# Patient Record
Sex: FEMALE | Race: WHITE | Employment: FULL TIME | URBAN - METROPOLITAN AREA
[De-identification: names, ages, dates, MRNs, and addresses within clinical notes are randomized per-mention and may not be internally consistent; named-entity substitution may affect disease eponyms.]

---

## 2016-07-11 LAB — ANTIBODY: NORMAL

## 2019-12-20 LAB
AVERAGE GLUCOSE: 108
CHOLESTEROL, TOTAL: 223 MG/DL
CHOLESTEROL/HDL RATIO: ABNORMAL
HBA1C MFR BLD: 5.4 %
HDLC SERPL-MCNC: 78 MG/DL (ref 35–70)
LDL CHOLESTEROL CALCULATED: 118 MG/DL (ref 0–160)
NONHDLC SERPL-MCNC: ABNORMAL MG/DL
TRIGL SERPL-MCNC: 137 MG/DL
VLDLC SERPL CALC-MCNC: 27 MG/DL

## 2020-06-07 ENCOUNTER — HOSPITAL ENCOUNTER (EMERGENCY)
Age: 55
Discharge: HOME | End: 2020-06-07
Payer: COMMERCIAL

## 2020-06-07 VITALS
SYSTOLIC BLOOD PRESSURE: 130 MMHG | DIASTOLIC BLOOD PRESSURE: 80 MMHG | HEIGHT: 64 IN | TEMPERATURE: 97.7 F | WEIGHT: 185 LBS | RESPIRATION RATE: 16 BRPM | HEART RATE: 92 BPM | BODY MASS INDEX: 31.58 KG/M2 | OXYGEN SATURATION: 97 %

## 2020-06-07 PROCEDURE — 99282 EMERGENCY DEPT VISIT SF MDM: CPT

## 2020-06-07 PROCEDURE — 6370000000 HC RX 637 (ALT 250 FOR IP): Performed by: NURSE PRACTITIONER

## 2020-06-07 PROCEDURE — 96372 THER/PROPH/DIAG INJ SC/IM: CPT

## 2020-06-07 PROCEDURE — 6360000002 HC RX W HCPCS: Performed by: NURSE PRACTITIONER

## 2020-06-07 RX ORDER — CYCLOBENZAPRINE HCL 10 MG
10 TABLET ORAL 3 TIMES DAILY PRN
Qty: 15 TABLET | Refills: 0 | Status: SHIPPED | OUTPATIENT
Start: 2020-06-07 | End: 2020-06-17

## 2020-06-07 RX ORDER — OXYCODONE HYDROCHLORIDE AND ACETAMINOPHEN 5; 325 MG/1; MG/1
1 TABLET ORAL EVERY 4 HOURS PRN
Qty: 10 TABLET | Refills: 0 | Status: SHIPPED | OUTPATIENT
Start: 2020-06-07 | End: 2020-06-10

## 2020-06-07 RX ORDER — NAPROXEN 500 MG/1
500 TABLET ORAL 2 TIMES DAILY
Qty: 20 TABLET | Refills: 0 | Status: SHIPPED | OUTPATIENT
Start: 2020-06-07 | End: 2021-01-29

## 2020-06-07 RX ORDER — OXYCODONE HYDROCHLORIDE AND ACETAMINOPHEN 5; 325 MG/1; MG/1
1 TABLET ORAL ONCE
Status: COMPLETED | OUTPATIENT
Start: 2020-06-07 | End: 2020-06-07

## 2020-06-07 RX ORDER — ORPHENADRINE CITRATE 30 MG/ML
60 INJECTION INTRAMUSCULAR; INTRAVENOUS ONCE
Status: COMPLETED | OUTPATIENT
Start: 2020-06-07 | End: 2020-06-07

## 2020-06-07 RX ORDER — KETOROLAC TROMETHAMINE 30 MG/ML
30 INJECTION, SOLUTION INTRAMUSCULAR; INTRAVENOUS ONCE
Status: COMPLETED | OUTPATIENT
Start: 2020-06-07 | End: 2020-06-07

## 2020-06-07 RX ADMIN — KETOROLAC TROMETHAMINE 30 MG: 30 INJECTION, SOLUTION INTRAMUSCULAR at 13:30

## 2020-06-07 RX ADMIN — ORPHENADRINE CITRATE 60 MG: 30 INJECTION INTRAMUSCULAR; INTRAVENOUS at 13:30

## 2020-06-07 RX ADMIN — OXYCODONE HYDROCHLORIDE AND ACETAMINOPHEN 1 TABLET: 5; 325 TABLET ORAL at 13:30

## 2020-06-07 ASSESSMENT — PAIN SCALES - GENERAL
PAINLEVEL_OUTOF10: 9
PAINLEVEL_OUTOF10: 9

## 2020-06-07 ASSESSMENT — ENCOUNTER SYMPTOMS
COUGH: 0
ABDOMINAL PAIN: 0
BACK PAIN: 1
SHORTNESS OF BREATH: 0
NAUSEA: 0
VOMITING: 0
DIARRHEA: 0

## 2020-06-07 ASSESSMENT — PAIN DESCRIPTION - PAIN TYPE: TYPE: ACUTE PAIN

## 2020-06-07 ASSESSMENT — PAIN DESCRIPTION - DESCRIPTORS: DESCRIPTORS: STABBING

## 2020-06-07 ASSESSMENT — PAIN DESCRIPTION - LOCATION: LOCATION: BACK

## 2020-06-07 ASSESSMENT — PAIN DESCRIPTION - FREQUENCY: FREQUENCY: CONTINUOUS

## 2020-06-07 NOTE — ED PROVIDER NOTES
[id number] [facility] ED  eMERGENCY dEPARTMENT eNCOUnter      Pt Name: Tamia Cifuentes  MRN: [id number]  [place] 1965  Date of evaluation: 6/7/2020  Provider: NABOR Castillo CNP      HISTORY OF PRESENT ILLNESS    Tamia Cifuentes is a 54 y.o. female who presents to the Emergency Department with mid back pain after shoveling dirt 4 days ago. Patient denies any injury or trauma otherwise. Pain is moderate to severe. Denies saddle anesthesia, foot drop or incontinence of bowel or bladder. Walking without difficulty. REVIEW OF SYSTEMS       Review of Systems   Constitutional: Negative for fever. HENT: Negative for congestion. Respiratory: Negative for cough and shortness of breath. Cardiovascular: Negative for chest pain. Gastrointestinal: Negative for abdominal pain, diarrhea, nausea and vomiting. Genitourinary: Negative for dysuria. Musculoskeletal: Positive for back pain. Negative for arthralgias and neck pain. Skin: Negative for rash. All other systems reviewed and are negative. PAST MEDICAL HISTORY     Past Medical History:   Diagnosis Date    Thyroid disease          SURGICAL HISTORY       Past Surgical History:   Procedure Laterality Date    BACK SURGERY           CURRENT MEDICATIONS       Previous Medications    No medications on file       ALLERGIES     Patient has no known allergies. FAMILY HISTORY     History reviewed. No pertinent family history. SOCIAL HISTORY       Social History     Socioeconomic History    Marital status:       Spouse name: None    Number of children: None    Years of education: None    Highest education level: None   Occupational History    None   Social Needs    Financial resource strain: None    Food insecurity     Worry: None     Inability: None    Transportation needs     Medical: None     Non-medical: None   Tobacco Use    Smoking status: Never Smoker    Smokeless tobacco: Never Used   Substance and Sexual

## 2021-01-29 ENCOUNTER — OFFICE VISIT (OUTPATIENT)
Dept: PRIMARY CARE CLINIC | Age: 56
End: 2021-01-29
Payer: COMMERCIAL

## 2021-01-29 VITALS
RESPIRATION RATE: 14 BRPM | BODY MASS INDEX: 33.29 KG/M2 | SYSTOLIC BLOOD PRESSURE: 130 MMHG | HEIGHT: 64 IN | DIASTOLIC BLOOD PRESSURE: 83 MMHG | WEIGHT: 195 LBS | OXYGEN SATURATION: 98 % | TEMPERATURE: 97.4 F | HEART RATE: 83 BPM

## 2021-01-29 DIAGNOSIS — E03.9 HYPOTHYROIDISM, UNSPECIFIED TYPE: ICD-10-CM

## 2021-01-29 DIAGNOSIS — F98.8 ATTENTION DEFICIT DISORDER (ADD) IN ADULT: Primary | ICD-10-CM

## 2021-01-29 PROCEDURE — G8427 DOCREV CUR MEDS BY ELIG CLIN: HCPCS | Performed by: INTERNAL MEDICINE

## 2021-01-29 PROCEDURE — 3017F COLORECTAL CA SCREEN DOC REV: CPT | Performed by: INTERNAL MEDICINE

## 2021-01-29 PROCEDURE — G8417 CALC BMI ABV UP PARAM F/U: HCPCS | Performed by: INTERNAL MEDICINE

## 2021-01-29 PROCEDURE — 1036F TOBACCO NON-USER: CPT | Performed by: INTERNAL MEDICINE

## 2021-01-29 PROCEDURE — G8484 FLU IMMUNIZE NO ADMIN: HCPCS | Performed by: INTERNAL MEDICINE

## 2021-01-29 PROCEDURE — 99202 OFFICE O/P NEW SF 15 MIN: CPT | Performed by: INTERNAL MEDICINE

## 2021-01-29 RX ORDER — OLOPATADINE HYDROCHLORIDE 1 MG/ML
1 SOLUTION/ DROPS OPHTHALMIC 2 TIMES DAILY
COMMUNITY

## 2021-01-29 RX ORDER — LISDEXAMFETAMINE DIMESYLATE 30 MG/1
CAPSULE ORAL
COMMUNITY
Start: 2020-11-21 | End: 2021-04-26 | Stop reason: CLARIF

## 2021-01-29 RX ORDER — LISDEXAMFETAMINE DIMESYLATE 30 MG/1
CAPSULE ORAL
Status: CANCELLED | OUTPATIENT
Start: 2021-01-29

## 2021-01-29 RX ORDER — LEVOTHYROXINE SODIUM 0.1 MG/1
TABLET ORAL
COMMUNITY
Start: 2020-12-23 | End: 2022-02-18 | Stop reason: SDUPTHER

## 2021-01-29 SDOH — ECONOMIC STABILITY: TRANSPORTATION INSECURITY
IN THE PAST 12 MONTHS, HAS THE LACK OF TRANSPORTATION KEPT YOU FROM MEDICAL APPOINTMENTS OR FROM GETTING MEDICATIONS?: NOT ASKED

## 2021-01-29 ASSESSMENT — PATIENT HEALTH QUESTIONNAIRE - PHQ9
SUM OF ALL RESPONSES TO PHQ QUESTIONS 1-9: 0
SUM OF ALL RESPONSES TO PHQ QUESTIONS 1-9: 0
SUM OF ALL RESPONSES TO PHQ9 QUESTIONS 1 & 2: 0
1. LITTLE INTEREST OR PLEASURE IN DOING THINGS: 0

## 2021-01-29 NOTE — PROGRESS NOTES
[facility] 54 y.o. female presents today with   Chief Complaint   Patient presents with    New Patient     to establish care. Gaining weight, discuss thyroid. Currently taking Vyvanse and Levothyroxine. Would like full blood work including Free T3, Free T4, TSH, RT 3, TPO       Mental Health Problem  The primary symptoms include somatic symptoms. The primary symptoms do not include hallucinations. The current episode started more than 1 month ago. This is a recurrent problem. The onset of the illness is precipitated by emotional stress and a stressful event. The degree of incapacity that she is experiencing as a consequence of her illness is moderate. Additional symptoms of the illness include attention impairment. She does not admit to suicidal ideas. she want a list of thyroid test    Past Medical History:   Diagnosis Date    Attention deficit disorder (ADD) in adult     dr Mahsa Leblanc Thyroid disease      There are no active problems to display for this patient. Past Surgical History:   Procedure Laterality Date    BACK SURGERY       No family history on file. Social History     Socioeconomic History    Marital status:      Spouse name: None    Number of children: None    Years of education: None    Highest education level: None   Occupational History    None   Social Needs    Financial resource strain: Not hard at all   [place] insecurity     Worry: Never true     Inability: Never true   [employer] needs     Medical: None     Non-medical: None   Tobacco Use    Smoking status: Never Smoker    Smokeless tobacco: Never Used   Substance and Sexual Activity    Alcohol use:  Yes    Drug use: Never    Sexual activity: None   Lifestyle    Physical activity     Days per week: None     Minutes per session: None    Stress: None   Relationships    Social connections     Talks on phone: None     Gets together: None     Attends Sabianism service: None Active member of club or organization: None     Attends meetings of clubs or organizations: None     Relationship status: None    Intimate partner violence     Fear of current or ex partner: None     Emotionally abused: None     Physically abused: None     Forced sexual activity: None   Other Topics Concern    None   Social History Narrative    None     No Known Allergies    Review of Systems   Constitutional: Negative for chills and fever. HENT: Negative for facial swelling and nosebleeds. Eyes: Negative for photophobia and visual disturbance. Respiratory: Negative for apnea and choking. Cardiovascular: Negative for chest pain and palpitations. Gastrointestinal: Negative for abdominal distention and blood in stool. Genitourinary: Negative for enuresis and hematuria. Musculoskeletal: Negative for gait problem and joint swelling. Skin: Negative for rash. Neurological: Negative for syncope and speech difficulty. Hematological: Does not bruise/bleed easily. Psychiatric/Behavioral: Negative for hallucinations and suicidal ideas. Vitals:    01/29/21 1017   BP: 130/83   Pulse: 83   Resp: 14   Temp: 97.4 °F (36.3 °C)   SpO2: 98%   Weight: 195 lb (88.5 kg)   Height: 5' 4\" (1.626 m)       Physical Exam  Constitutional:       Appearance: She is well-developed. HENT:      Head: Normocephalic and atraumatic. Eyes:      Pupils: Pupils are equal, round, and reactive to light. Neck:      Musculoskeletal: Normal range of motion and neck supple. Cardiovascular:      Rate and Rhythm: Normal rate and regular rhythm. Heart sounds: Normal heart sounds. Pulmonary:      Effort: No respiratory distress. Breath sounds: Normal breath sounds. No wheezing. Abdominal:      General: There is no distension. Musculoskeletal: Normal range of motion. Skin:     Coloration: Skin is not jaundiced. Neurological:      Mental Status: She is alert and oriented to person, place, and time. Psychiatric:         Mood and Affect: Mood normal.        Assessment/Plan  Julio Varela was seen today for new patient. Diagnoses and all orders for this visit:    Attention deficit disorder (ADD) in adult  -     Pain Management Drug Screen; Future  -     Discontinue: lisdexamfetamine (VYVANSE) 30 MG capsule; Take 1 capsule by mouth every morning for 30 days. -     lisdexamfetamine (VYVANSE) 30 MG capsule; Take 1 capsule by mouth every morning for 30 days. -     lisdexamfetamine (VYVANSE) 30 MG capsule; Take 1 capsule by mouth every morning for 30 days. Hypothyroidism, unspecified type  -     TSH with Reflex; Future  -     T4, Free; Future  -     T3, Free; Future  -     Thyroid Peroxidase Antibody; Future  -     T3, Reverse; Future  -     Anti-Thyroglobulin Antibody; Future        No follow-ups on file.     Emily Holguin MD

## 2021-01-31 ASSESSMENT — ENCOUNTER SYMPTOMS
CHOKING: 0
FACIAL SWELLING: 0
APNEA: 0
ABDOMINAL DISTENTION: 0
BLOOD IN STOOL: 0
PHOTOPHOBIA: 0

## 2021-04-26 ENCOUNTER — OFFICE VISIT (OUTPATIENT)
Dept: PRIMARY CARE CLINIC | Age: 56
End: 2021-04-26
Payer: COMMERCIAL

## 2021-04-26 VITALS
SYSTOLIC BLOOD PRESSURE: 122 MMHG | HEIGHT: 64 IN | BODY MASS INDEX: 32.78 KG/M2 | HEART RATE: 85 BPM | TEMPERATURE: 98.1 F | OXYGEN SATURATION: 98 % | WEIGHT: 192 LBS | DIASTOLIC BLOOD PRESSURE: 78 MMHG

## 2021-04-26 DIAGNOSIS — E03.9 HYPOTHYROIDISM, UNSPECIFIED TYPE: ICD-10-CM

## 2021-04-26 DIAGNOSIS — Z00.00 PREVENTATIVE HEALTH CARE: ICD-10-CM

## 2021-04-26 DIAGNOSIS — F98.8 ATTENTION DEFICIT DISORDER (ADD) IN ADULT: Primary | ICD-10-CM

## 2021-04-26 DIAGNOSIS — E78.5 HYPERLIPIDEMIA, UNSPECIFIED HYPERLIPIDEMIA TYPE: ICD-10-CM

## 2021-04-26 DIAGNOSIS — R73.9 HYPERGLYCEMIA: ICD-10-CM

## 2021-04-26 PROCEDURE — 99213 OFFICE O/P EST LOW 20 MIN: CPT | Performed by: INTERNAL MEDICINE

## 2021-04-26 PROCEDURE — G8427 DOCREV CUR MEDS BY ELIG CLIN: HCPCS | Performed by: INTERNAL MEDICINE

## 2021-04-26 PROCEDURE — G8417 CALC BMI ABV UP PARAM F/U: HCPCS | Performed by: INTERNAL MEDICINE

## 2021-04-26 PROCEDURE — 1036F TOBACCO NON-USER: CPT | Performed by: INTERNAL MEDICINE

## 2021-04-26 PROCEDURE — 3017F COLORECTAL CA SCREEN DOC REV: CPT | Performed by: INTERNAL MEDICINE

## 2021-04-26 RX ORDER — CYCLOSPORINE 0.5 MG/ML
EMULSION OPHTHALMIC
COMMUNITY
Start: 2021-03-24

## 2021-04-26 ASSESSMENT — PATIENT HEALTH QUESTIONNAIRE - PHQ9
SUM OF ALL RESPONSES TO PHQ QUESTIONS 1-9: 0
SUM OF ALL RESPONSES TO PHQ QUESTIONS 1-9: 0
2. FEELING DOWN, DEPRESSED OR HOPELESS: 0
1. LITTLE INTEREST OR PLEASURE IN DOING THINGS: 0
SUM OF ALL RESPONSES TO PHQ9 QUESTIONS 1 & 2: 0

## 2021-04-26 NOTE — PROGRESS NOTES
Nasir Travis 64 y.o. female presents today with   Chief Complaint   Patient presents with    [address] Maintenance     pending colonoscopy       Mental Health Problem  The primary symptoms include somatic symptoms. The primary symptoms do not include hallucinations. The current episode started more than 1 month ago. This is a recurrent problem. The onset of the illness is precipitated by emotional stress and a stressful event. The degree of incapacity that she is experiencing as a consequence of her illness is moderate. Additional symptoms of the illness include attention impairment. She does not admit to suicidal ideas. Past Medical History:   Diagnosis Date    Attention deficit disorder (ADD) in adult     dr Adriana Hung Thyroid disease      There are no active problems to display for this patient. Past Surgical History:   Procedure Laterality Date    BACK SURGERY       No family history on file. Social History     Socioeconomic History    Marital status:      Spouse name: None    Number of children: None    Years of education: None    Highest education level: None   Occupational History    None   Social Needs    Financial resource strain: Not hard at all   [place] insecurity     Worry: Never true     Inability: Never true   [employer] needs     Medical: None     Non-medical: None   Tobacco Use    Smoking status: Never Smoker    Smokeless tobacco: Never Used   Substance and Sexual Activity    Alcohol use:  Yes    Drug use: Never    Sexual activity: None   Lifestyle    Physical activity     Days per week: None     Minutes per session: None    Stress: None   Relationships    Social connections     Talks on phone: None     Gets together: None     Attends Zoroastrianism service: None     Active member of club or organization: None     Attends meetings of clubs or organizations: None     Relationship status: None    Intimate partner violence     Fear of current or ex partner: None     Emotionally abused: None     Physically abused: None     Forced sexual activity: None   Other Topics Concern    None   Social History Narrative    None     No Known Allergies    Review of Systems   Constitutional: Negative for fever. HENT: Negative for facial swelling and nosebleeds. Eyes: Negative for photophobia and visual disturbance. Respiratory: Negative for apnea and choking. Cardiovascular: Negative for chest pain and palpitations. Gastrointestinal: Negative for abdominal distention and blood in stool. Genitourinary: Negative for enuresis. Musculoskeletal: Negative for gait problem and joint swelling. Skin: Negative for rash. Neurological: Negative for syncope and speech difficulty. Hematological: Does not bruise/bleed easily. Psychiatric/Behavioral: Positive for decreased concentration. Negative for hallucinations and suicidal ideas. Vitals:    04/26/21 0840 04/26/21 0845   BP: 122/82 122/78   Site: Right Upper Arm    Cuff Size: Large Adult Large Adult   Pulse: 85    Temp: 98.1 °F (36.7 °C)    SpO2: 98%    Weight: 192 lb (87.1 kg)    Height: 5' 4\" (1.626 m)        Physical Exam  Constitutional:       Appearance: She is well-developed. HENT:      Head: Normocephalic and atraumatic. Eyes:      Pupils: Pupils are equal, round, and reactive to light. Neck:      Musculoskeletal: Normal range of motion. Cardiovascular:      Rate and Rhythm: Normal rate and regular rhythm. Heart sounds: Normal heart sounds. Pulmonary:      Effort: No respiratory distress. Breath sounds: Normal breath sounds. No wheezing or rales. Abdominal:      General: Bowel sounds are normal.      Palpations: Abdomen is soft. Musculoskeletal: Normal range of motion. Skin:     Coloration: Skin is not jaundiced. Neurological:      Mental Status: She is alert and oriented to person, place, and time.         Assessment/Plan  Percy Cochran was seen today for 3 month follow-up and health maintenance. Diagnoses and all orders for this visit:    Attention deficit disorder (ADD) in adult  -     CBC With Auto Differential; Future  -     Discontinue: lisdexamfetamine (VYVANSE) 30 MG capsule; Take 1 capsule by mouth every morning for 30 days. -     Discontinue: lisdexamfetamine (VYVANSE) 30 MG capsule; Take 1 capsule by mouth every morning for 30 days. -     lisdexamfetamine (VYVANSE) 30 MG capsule; Take 1 capsule by mouth every morning for 30 days. Preventative health care  -     CBC With Auto Differential; Future  -     Comprehensive Metabolic Panel; Future  -     Lipid, Fasting; Future  -     TSH with Reflex; Future  -     T4, Free; Future  -     Hemoglobin A1C; Future    Hyperglycemia  -     Hemoglobin A1C; Future    Hypothyroidism, unspecified type  -     TSH with Reflex; Future  -     T4, Free; Future    Hyperlipidemia, unspecified hyperlipidemia type  -     Comprehensive Metabolic Panel; Future  -     Lipid, Fasting; Future        No follow-ups on file.     Beth Gomez MD

## 2021-04-29 ASSESSMENT — ENCOUNTER SYMPTOMS
FACIAL SWELLING: 0
CHOKING: 0
BLOOD IN STOOL: 0
ABDOMINAL DISTENTION: 0
APNEA: 0
PHOTOPHOBIA: 0

## 2021-05-28 ENCOUNTER — TELEPHONE (OUTPATIENT)
Dept: PRIMARY CARE CLINIC | Age: 56
End: 2021-05-28

## 2021-05-28 DIAGNOSIS — F98.8 ATTENTION DEFICIT DISORDER (ADD) IN ADULT: ICD-10-CM

## 2022-02-18 ENCOUNTER — OFFICE VISIT (OUTPATIENT)
Dept: PRIMARY CARE CLINIC | Age: 57
End: 2022-02-18
Payer: COMMERCIAL

## 2022-02-18 VITALS
HEART RATE: 110 BPM | HEIGHT: 64 IN | SYSTOLIC BLOOD PRESSURE: 122 MMHG | OXYGEN SATURATION: 97 % | DIASTOLIC BLOOD PRESSURE: 82 MMHG | TEMPERATURE: 97.8 F | BODY MASS INDEX: 30.39 KG/M2 | WEIGHT: 178 LBS

## 2022-02-18 DIAGNOSIS — Z51.81 THERAPEUTIC DRUG MONITORING: ICD-10-CM

## 2022-02-18 DIAGNOSIS — R73.9 HYPERGLYCEMIA: ICD-10-CM

## 2022-02-18 DIAGNOSIS — E03.9 HYPOTHYROIDISM, UNSPECIFIED TYPE: ICD-10-CM

## 2022-02-18 DIAGNOSIS — E78.5 HYPERLIPIDEMIA, UNSPECIFIED HYPERLIPIDEMIA TYPE: ICD-10-CM

## 2022-02-18 DIAGNOSIS — F98.8 ATTENTION DEFICIT DISORDER (ADD) IN ADULT: Primary | ICD-10-CM

## 2022-02-18 DIAGNOSIS — Z00.00 PREVENTATIVE HEALTH CARE: ICD-10-CM

## 2022-02-18 PROCEDURE — 99213 OFFICE O/P EST LOW 20 MIN: CPT | Performed by: INTERNAL MEDICINE

## 2022-02-18 RX ORDER — LEVOTHYROXINE SODIUM 0.1 MG/1
100 TABLET ORAL DAILY
Qty: 30 TABLET | Refills: 5 | Status: SHIPPED | OUTPATIENT
Start: 2022-02-18 | End: 2022-02-23 | Stop reason: SDUPTHER

## 2022-02-18 SDOH — ECONOMIC STABILITY: FOOD INSECURITY: WITHIN THE PAST 12 MONTHS, YOU WORRIED THAT YOUR FOOD WOULD RUN OUT BEFORE YOU GOT MONEY TO BUY MORE.: NEVER TRUE

## 2022-02-18 SDOH — ECONOMIC STABILITY: FOOD INSECURITY: WITHIN THE PAST 12 MONTHS, THE FOOD YOU BOUGHT JUST DIDN'T LAST AND YOU DIDN'T HAVE MONEY TO GET MORE.: NEVER TRUE

## 2022-02-18 SDOH — ECONOMIC STABILITY: TRANSPORTATION INSECURITY
IN THE PAST 12 MONTHS, HAS THE LACK OF TRANSPORTATION KEPT YOU FROM MEDICAL APPOINTMENTS OR FROM GETTING MEDICATIONS?: NO

## 2022-02-18 SDOH — ECONOMIC STABILITY: TRANSPORTATION INSECURITY
IN THE PAST 12 MONTHS, HAS LACK OF TRANSPORTATION KEPT YOU FROM MEETINGS, WORK, OR FROM GETTING THINGS NEEDED FOR DAILY LIVING?: NO

## 2022-02-18 ASSESSMENT — SOCIAL DETERMINANTS OF HEALTH (SDOH): HOW HARD IS IT FOR YOU TO PAY FOR THE VERY BASICS LIKE FOOD, HOUSING, MEDICAL CARE, AND HEATING?: NOT HARD AT ALL

## 2022-02-18 ASSESSMENT — PATIENT HEALTH QUESTIONNAIRE - PHQ9
6. FEELING BAD ABOUT YOURSELF - OR THAT YOU ARE A FAILURE OR HAVE LET YOURSELF OR YOUR FAMILY DOWN: 0
9. THOUGHTS THAT YOU WOULD BE BETTER OFF DEAD, OR OF HURTING YOURSELF: 0
7. TROUBLE CONCENTRATING ON THINGS, SUCH AS READING THE NEWSPAPER OR WATCHING TELEVISION: 0
SUM OF ALL RESPONSES TO PHQ9 QUESTIONS 1 & 2: 0
1. LITTLE INTEREST OR PLEASURE IN DOING THINGS: 0
4. FEELING TIRED OR HAVING LITTLE ENERGY: 0
SUM OF ALL RESPONSES TO PHQ QUESTIONS 1-9: 0
10. IF YOU CHECKED OFF ANY PROBLEMS, HOW DIFFICULT HAVE THESE PROBLEMS MADE IT FOR YOU TO DO YOUR WORK, TAKE CARE OF THINGS AT HOME, OR GET ALONG WITH OTHER PEOPLE: 0
5. POOR APPETITE OR OVEREATING: 0
SUM OF ALL RESPONSES TO PHQ QUESTIONS 1-9: 0
3. TROUBLE FALLING OR STAYING ASLEEP: 0
8. MOVING OR SPEAKING SO SLOWLY THAT OTHER PEOPLE COULD HAVE NOTICED. OR THE OPPOSITE, BEING SO FIGETY OR RESTLESS THAT YOU HAVE BEEN MOVING AROUND A LOT MORE THAN USUAL: 0
2. FEELING DOWN, DEPRESSED OR HOPELESS: 0
SUM OF ALL RESPONSES TO PHQ QUESTIONS 1-9: 0
SUM OF ALL RESPONSES TO PHQ QUESTIONS 1-9: 0

## 2022-02-18 NOTE — LETTER
CONTROLLED SUBSTANCE MEDICATION AGREEMENT     Patient Name: Jagruti Ragland  Patient YOB: 1965   I understand, that controlled substance medications may be used to help better manage my symptoms and to improve my ability to function at home, work and in social settings. However, I also understand that these medications do have risks, which have been discussed with me, including possible development of physical or psychological dependence. I understand that successful treatment requires mutual trust and honesty between me and my provider. I understand and agree that following this Medication Agreement is necessary in continuing my provider-patient relationship and the success of my treatment plan. Explanation from my Provider: Benefits and Goals of Controlled Substance Medications: There are two potential goals for your treatment: (1) decreased pain and suffering (2) improved daily life functions. There are many possible treatments for your chronic condition(s). Alternatives such as physical therapy, yoga, massage, home daily exercise, meditation, relaxation techniques, injections, chiropractic manipulations, surgery, cognitive therapy, hypnosis and many medications that are not habit-forming may be used. Use of controlled substance medications may be helpful, but they are unlikely to resolve all symptoms or restore all function. Explanation from my Provider: Risks of Controlled Substance Medications:  Opioid pain medications: These medications can lead to problems such as addiction/dependence, sedation, lightheadedness/dizziness, memory issues, falls, constipation, nausea, or vomiting. They may also impair the ability to drive or operate machinery. Additionally, these medications may lower testosterone levels, leading to loss of bone strength, stamina and sex drive.   They may cause problems with breathing, sleep apnea and reduced coughing, which is especially dangerous for patients with lung are combined with other stimulants, such as caffeine pills or energy drinks, certain weight loss supplements and oral decongestants. Dependence withdrawal symptoms may include depressed mood, loss of interest, suicidal thoughts, anxiety, fatigue, appetite changes and agitation. Testosterone replacement therapy:  Potential side effects include increased risk of stroke and heart attack, blood clots, increased blood pressure, increased cholesterol, enlarged prostate, sleep apnea, irritability/aggression and other mood disorders, and decreased fertility. I agree and understand that I and my prescriber have the following rights and responsibilities regarding my treatment plan:     1. MY RIGHTS:  To be informed of my treatment and medication plan. To be an active participant in my health and wellbeing. 2. MY RESPONSIBILITY AND UNDERSTANDING FOR USE OF MEDICATIONS   I will take medications at the dose and frequency as directed. For my safety, I will not increase or change how I take my medications without the recommendation of my healthcare provider.  I will actively participate in any program recommended by my provider which may improve function, including social, physical, psychological programs.  I will not take my medications with alcohol or other drugs not prescribed to me. I understand that drinking alcohol with my medications increases the chances of side effects, including reduced breathing rate and could lead to personal injury when operating machinery.  I understand that if I have a history of substance use disorders, including alcohol or other illicit drugs, that I may be at increased risk of addiction to my medications.  I agree to notify my provider immediately if I should become pregnant so that my treatment plan can be adjusted.    I agree and understand that I shall only receive controlled substance medications from the prescriber that signed this agreement unless there is written agreement among other prescribers of controlled substances outlining the responsibility of the medications being prescribed.  I understand that the if the controlled medication is not helping to achieve goals, the dosage may be tapered and no longer prescribed. 3. MY RESPONSIBILITY FOR COMMUNICATION / PRESCRIPTION RENEWALS   I agree that all controlled substance medications that I take will be prescribed only by my provider. If another healthcare provider prescribes me medication in an emergency, I will notify my provider within seventy-two (72) hours.  I will arrange for refills at the prescribed interval ONLY during regular office hours. I will not ask for refills earlier than agreed, after-hours, on holidays or weekends. Refills may take up to 72 hours for processing and prescriptions to reach the pharmacy.  I will inform my other health care providers that I am taking these medications and of the existence of this [id number]. In the event of an emergency, I will provide the same information to the emergency department prescribers.  I will keep my provider updated on the pharmacy I am using for controlled medication prescription filling. Initials:_______  4. MY RESPONSIBILITY FOR PROTECTING MEDICATIONS   I will protect my prescriptions and medications. I understand that lost or misplaced prescriptions will not be replaced.  I will keep medications only for my own use and will not share them with others. I will keep all medications away from children.  I agree that if my medications are adjusted or discontinued, I will properly dispose of any remaining medications. I understand that I will be required to dispose of any remaining controlled medications as, directed by my prescriber, prior to being provided with any prescriptions for other controlled medications.   Medication drop box locations can be found at: [email or web address]    5. MY RESPONSIBILITY WITH ILLEGAL DRUGS    I will not use illegal or street drugs or another person's prescription medications not prescribed to me.  If there are identified addiction type symptoms, then referral to a program may be provided by my provider and I agree to follow through with this recommendation. 6. MY RESPONSIBILITY FOR COOPERATION WITH INVESTIGATIONS   I understand that my provider will comply with any applicable law and may discuss my use and/or possible misuse/abuse of controlled substances and alcohol, as appropriate, with any health care provider involved in my care, pharmacist, or legal authority.  I authorize my provider and pharmacy to cooperate fully with law enforcement agencies (as permitted by law) in the investigation of any possible misuse, sale, or other diversion of my controlled substances.  I agree to waive any applicable privilege or right of privacy or confidentiality with respect to these authorizations. 7. PROVIDERS RIGHT TO MONITOR FOR SAFETY: PRESCRIPTION MONITORING / DRUG TESTING   I consent to drug/toxicology screening and will submit to a drug screen upon my providers request to assure I am only taking the prescribed drugs for my safety monitoring. I understand that a drug screen is a laboratory test in which a sample of my urine, blood or saliva is checked to see what drugs I have been taking. This may entail an observed urine specimen, which means that a nurse or other health care provider may watch me provide urine, and I will cooperate if I am asked to provide an observed specimen.  I understand that my provider will check a copy of my State Prescription Monitoring Program () Report in order to safely prescribe medications.  Pill Counts: I consent to pill counts when requested.   I may be asked to bring all my prescribed controlled substance medications, in their original bottles, to all of my scheduled appointments. In addition, my provider may ask me to come to the practice at any time for a random pill count. 8. TERMINATION OF THIS AGREEMENT  For my safety, my prescriber has the right to stop prescribing controlled substance medications and may end this agreement. Initials:_______   Conditions that may result in termination of this agreement:  a. I do not show any improvement in pain, or my activity has not improved. b. I develop rapid tolerance or loss of improvement, as described in my treatment plan.  c. I develop significant side effects from the medication. d. My behavior is not consistent with the responsibilities outlined above, thereby causing safety concerns to continue prescribing controlled substance medications. e. I fail to follow the terms of this agreement. f. Other:____________________________       UNDERSTANDING THIS MEDICATION AGREEMENT:    I have read the above and have had all my questions answered. For chronic disease management, I know that my symptoms can be managed with many types of treatments. A chronic medication trial may be part of my treatment, but I must be an active participant in my care. Medication therapy is only one part of my symptom management plan. In some cases, there may be limited scientific evidence to support the chronic use of certain medications to improve symptoms and daily function. Furthermore, in certain circumstances, there may be scientific information that suggests that the use of chronic controlled substances may worsen my symptoms and increase my risk of unintentional death directly related to this medication therapy. I know that if my provider feels my risk from controlled medications is greater than my benefit, I will have my controlled substance medication(s) compassionately lowered or removed altogether.      I further agree to allow this office to contact my HIPAA contact if there are concerns about my safety and use of the controlled medications. I have agreed to use the prescribed controlled substance medications to me as instructed by my provider and as stated in this Medication Agreement. My initial on each page and my signature below shows that I have read each page and I have had the opportunity to ask questions with answers provided by my provider.     Patient Name (Printed): _____________________________________  Patient Signature:  ______________________   Date: _____________    Prescriber Name (Printed): ___________________________________  Prescriber Signature: _____________________  Date: _____________

## 2022-02-18 NOTE — PROGRESS NOTES
Grisel Artist 62 y.o. female presents today with   Chief Complaint   Patient presents with    3 Month Follow-Up    ADHD    Hypothyroidism    Medication Refill       Mental Health Problem  The primary symptoms include somatic symptoms. The current episode started more than 1 month ago. This is a recurrent problem. Somatic symptoms do not include headaches. The onset of the illness is precipitated by emotional stress and a stressful event. The degree of incapacity that she is experiencing as a consequence of her illness is moderate. Additional symptoms of the illness include attention impairment and distractible. Additional symptoms of the illness do not include headaches. To check thyroid  Past Medical History:   Diagnosis Date    Attention deficit disorder (ADD) in adult     dr Quiles Dun Thyroid disease      There are no problems to display for this patient. Past Surgical History:   Procedure Laterality Date    BACK SURGERY       No family history on file. Social History     Socioeconomic History    Marital status:      Spouse name: None    Number of children: None    Years of education: None    Highest education level: None   Occupational History    None   Tobacco Use    Smoking status: Never Smoker    Smokeless tobacco: Never Used   Substance and Sexual Activity    Alcohol use: Yes    Drug use: Never    Sexual activity: None   Other Topics Concern    None   Social History Narrative    None     Social Determinants of Health     Financial Resource Strain: Low Risk     Difficulty of Paying Living Expenses: Not hard at all   Food Insecurity: No Food Insecurity    Worried About Running Out of Food in the Last Year: Never true    Blake of Food in the Last Year: Never true   Transportation Needs: No Transportation Needs    Lack of Transportation (Medical): No    Lack of Transportation (Non-Medical):  No   Physical Activity:     Days of Exercise per Week: Not on file    Minutes of Exercise per Session: Not on file   Stress:     Feeling of Stress : Not on file   Social Connections:     Frequency of Communication with Friends and Family: Not on file    Frequency of Social Gatherings with Friends and Family: Not on file    Attends Taoism Services: Not on file    Active Member of Clubs or Organizations: Not on file    Attends Club or Organization Meetings: Not on file    Marital Status: Not on file   Intimate Partner Violence:     Fear of Current or Ex-Partner: Not on file    Emotionally Abused: Not on file    Physically Abused: Not on file    Sexually Abused: Not on file   Housing Stability:     Unable to Pay for Housing in the Last Year: Not on file    Number of [place] in the Last Year: Not on file    Unstable Housing in the Last Year: Not on file     No Known Allergies    Review of Systems   Constitutional: Negative for fever. HENT: Negative for congestion. Eyes: Negative for visual disturbance. Cardiovascular: Negative for chest pain and palpitations. Gastrointestinal: Negative for abdominal distention and blood in stool. Genitourinary: Negative for dysuria. Musculoskeletal: Negative for gait problem and joint swelling. Skin: Negative for rash. Neurological: Negative for headaches. Psychiatric/Behavioral: Positive for decreased concentration. Negative for suicidal ideas. Vitals:    02/18/22 0950   BP: 122/82   Site: Left Upper Arm   Cuff Size: Large Adult   Pulse: 110   Temp: 97.8 °F (36.6 °C)   SpO2: 97%   Weight: 178 lb (80.7 kg)   Height: 5' 4\" (1.626 m)       Physical Exam  Constitutional:       Appearance: She is well-developed. HENT:      Head: Normocephalic. Eyes:      Pupils: Pupils are equal, round, and reactive to light. Cardiovascular:      Rate and Rhythm: Normal rate. Heart sounds: Normal heart sounds. Pulmonary:      Breath sounds: Normal breath sounds.    Abdominal:      General: Bowel sounds are normal. Palpations: Abdomen is soft. Musculoskeletal:         General: Normal range of motion. Cervical back: Normal range of motion. Skin:     Coloration: Skin is not jaundiced. Neurological:      General: No focal deficit present. Assessment/Plan  Melvi Clement was seen today for 3 month follow-up, adhd, hypothyroidism and medication refill. Diagnoses and all orders for this visit:    Attention deficit disorder (ADD) in adult  -     lisdexamfetamine (VYVANSE) 30 MG capsule; Take 1 capsule by mouth every morning for 30 days. -     lisdexamfetamine (VYVANSE) 30 MG capsule; Take 1 capsule by mouth every morning for 30 days. -     lisdexamfetamine (VYVANSE) 30 MG capsule; Take 1 capsule by mouth every morning for 30 days. Hypothyroidism, unspecified type  -     TSH with Reflex; Future  -     T4, Free; Future  -     Discontinue: levothyroxine (SYNTHROID) 100 MCG tablet; Take 1 tablet by mouth Daily    Preventative health care  -     Comprehensive Metabolic Panel; Future  -     CBC with Auto Differential; Future    Hyperglycemia  -     Cancel: Hemoglobin A1C; Future    Hyperlipidemia, unspecified hyperlipidemia type  -     Comprehensive Metabolic Panel; Future  -     Lipid, Fasting; Future    Therapeutic drug monitoring  -     Pain Management Drug Screen; Future      Controlled Substances Monitoring: Periodic Controlled Substance Monitoring: Possible medication side effects, risk of tolerance/dependence & alternative treatments discussed. ,No signs of potential drug abuse or diversion identified. ,Assessed functional status. Savanna Klein MD)      Return in about 3 months (around 5/18/2022), or if symptoms worsen or fail to improve.     Savanna Klein MD

## 2022-02-22 DIAGNOSIS — R73.9 HYPERGLYCEMIA: ICD-10-CM

## 2022-02-22 DIAGNOSIS — E78.5 HYPERLIPIDEMIA, UNSPECIFIED HYPERLIPIDEMIA TYPE: ICD-10-CM

## 2022-02-22 DIAGNOSIS — E03.9 HYPOTHYROIDISM, UNSPECIFIED TYPE: ICD-10-CM

## 2022-02-22 DIAGNOSIS — Z00.00 PREVENTATIVE HEALTH CARE: ICD-10-CM

## 2022-02-22 LAB
ALBUMIN SERPL-MCNC: 4 G/DL (ref 3.5–4.6)
ALP BLD-CCNC: 69 U/L (ref 40–130)
ALT SERPL-CCNC: 20 U/L (ref 0–33)
ANION GAP SERPL CALCULATED.3IONS-SCNC: 11 MEQ/L (ref 9–15)
AST SERPL-CCNC: 20 U/L (ref 0–35)
BASOPHILS ABSOLUTE: 0 K/UL (ref 0–0.2)
BASOPHILS RELATIVE PERCENT: 0.8 %
BILIRUB SERPL-MCNC: <0.2 MG/DL (ref 0.2–0.7)
BUN BLDV-MCNC: 11 MG/DL (ref 6–20)
CALCIUM SERPL-MCNC: 8.7 MG/DL (ref 8.5–9.9)
CHLORIDE BLD-SCNC: 105 MEQ/L (ref 95–107)
CHOLESTEROL, FASTING: 215 MG/DL (ref 0–199)
CO2: 25 MEQ/L (ref 20–31)
CREAT SERPL-MCNC: 0.78 MG/DL (ref 0.5–0.9)
EOSINOPHILS ABSOLUTE: 0.1 K/UL (ref 0–0.7)
EOSINOPHILS RELATIVE PERCENT: 2 %
GFR AFRICAN AMERICAN: >60
GFR NON-AFRICAN AMERICAN: >60
GLOBULIN: 3 G/DL (ref 2.3–3.5)
GLUCOSE BLD-MCNC: 84 MG/DL (ref 70–99)
HBA1C MFR BLD: 5.5 % (ref 4.8–5.9)
HCT VFR BLD CALC: 40.3 % (ref 37–47)
HDLC SERPL-MCNC: 64 MG/DL (ref 40–59)
HEMOGLOBIN: 13.2 G/DL (ref 12–16)
LDL CHOLESTEROL CALCULATED: 129 MG/DL (ref 0–129)
LYMPHOCYTES ABSOLUTE: 1.7 K/UL (ref 1–4.8)
LYMPHOCYTES RELATIVE PERCENT: 34.9 %
MCH RBC QN AUTO: 30.3 PG (ref 27–31.3)
MCHC RBC AUTO-ENTMCNC: 32.8 % (ref 33–37)
MCV RBC AUTO: 92.3 FL (ref 82–100)
MONOCYTES ABSOLUTE: 0.5 K/UL (ref 0.2–0.8)
MONOCYTES RELATIVE PERCENT: 10.5 %
NEUTROPHILS ABSOLUTE: 2.6 K/UL (ref 1.4–6.5)
NEUTROPHILS RELATIVE PERCENT: 51.8 %
PDW BLD-RTO: 13.8 % (ref 11.5–14.5)
PLATELET # BLD: 206 K/UL (ref 130–400)
POTASSIUM SERPL-SCNC: 4.1 MEQ/L (ref 3.4–4.9)
RBC # BLD: 4.36 M/UL (ref 4.2–5.4)
SODIUM BLD-SCNC: 141 MEQ/L (ref 135–144)
T4 FREE: 0.88 NG/DL (ref 0.84–1.68)
TOTAL PROTEIN: 7 G/DL (ref 6.3–8)
TRIGLYCERIDE, FASTING: 108 MG/DL (ref 0–150)
TSH REFLEX: 8.86 UIU/ML (ref 0.44–3.86)
WBC # BLD: 4.9 K/UL (ref 4.8–10.8)

## 2022-02-23 ENCOUNTER — TELEPHONE (OUTPATIENT)
Dept: FAMILY MEDICINE CLINIC | Age: 57
End: 2022-02-23

## 2022-02-23 DIAGNOSIS — E03.9 HYPOTHYROIDISM, UNSPECIFIED TYPE: ICD-10-CM

## 2022-02-23 LAB
6-ACETYLMORPHINE: NOT DETECTED
7-AMINOCLONAZEPAM: NOT DETECTED
ALPHA-OH-ALPRAZOLAM: NOT DETECTED
ALPHA-OH-MIDAZOLAM, URINE: NOT DETECTED
ALPRAZOLAM: NOT DETECTED
AMPHETAMINE: PRESENT
BARBITURATES: NOT DETECTED
BENZOYLECGONINE: NOT DETECTED
BUPRENORPHINE: NOT DETECTED
CARISOPRODOL: NOT DETECTED
CLONAZEPAM: NOT DETECTED
CODEINE: NOT DETECTED
CREATININE URINE: 226.4 MG/DL (ref 20–400)
DIAZEPAM: NOT DETECTED
EER PAIN MGT DRUG PANEL, HIGH RES/EMIT U: NORMAL
ETHYL GLUCURONIDE: PRESENT
FENTANYL: NOT DETECTED
GABAPENTIN: NOT DETECTED
HYDROCODONE: NOT DETECTED
HYDROMORPHONE: NOT DETECTED
LORAZEPAM: NOT DETECTED
MARIJUANA METABOLITE: PRESENT
MDA: NOT DETECTED
MDEA: NOT DETECTED
MDMA URINE: NOT DETECTED
MEPERIDINE: NOT DETECTED
METHADONE: NOT DETECTED
METHAMPHETAMINE: NOT DETECTED
METHYLPHENIDATE: NOT DETECTED
MIDAZOLAM: NOT DETECTED
MORPHINE: NOT DETECTED
NALOXONE: NOT DETECTED
NORBUPRENORPHINE, FREE: NOT DETECTED
NORDIAZEPAM: NOT DETECTED
NORFENTANYL: NOT DETECTED
NORHYDROCODONE, URINE: NOT DETECTED
NOROXYCODONE: NOT DETECTED
NOROXYMORPHONE, URINE: NOT DETECTED
OXAZEPAM: NOT DETECTED
OXYCODONE: NOT DETECTED
OXYMORPHONE: NOT DETECTED
PAIN MANAGEMENT DRUG PANEL: NORMAL
PCP: NOT DETECTED
PHENTERMINE: NOT DETECTED
PREGABALIN: NOT DETECTED
TAPENTADOL, URINE: NOT DETECTED
TAPENTADOL-O-SULFATE, URINE: NOT DETECTED
TEMAZEPAM: NOT DETECTED
TRAMADOL: NOT DETECTED
ZOLPIDEM: NOT DETECTED

## 2022-02-23 RX ORDER — LEVOTHYROXINE SODIUM 0.12 MG/1
120 TABLET ORAL DAILY
Qty: 90 TABLET | Refills: 1 | Status: SHIPPED | OUTPATIENT
Start: 2022-02-23 | End: 2022-02-23 | Stop reason: SDUPTHER

## 2022-02-23 RX ORDER — LEVOTHYROXINE SODIUM 0.12 MG/1
120 TABLET ORAL DAILY
Qty: 90 TABLET | Refills: 1 | Status: SHIPPED | OUTPATIENT
Start: 2022-02-23 | End: 2022-05-20 | Stop reason: SDUPTHER

## 2022-02-23 NOTE — TELEPHONE ENCOUNTER
----- Message from Lorenzo Farah sent at 2/23/2022 10:53 AM EST -----  Subject: Message to Provider    QUESTIONS  Information for Provider? patient wants a call if lab results changes her   script before she gets it filled   ---------------------------------------------------------------------------  --------------  [address]  What is the best way for the office to contact you? OK to leave message on   voicemail  Preferred Call Back Phone Number? [phone]  ---------------------------------------------------------------------------  --------------  SCRIPT ANSWERS  Relationship to Patient?  Self

## 2022-02-24 ENCOUNTER — TELEPHONE (OUTPATIENT)
Dept: PRIMARY CARE CLINIC | Age: 57
End: 2022-02-24

## 2022-02-27 ASSESSMENT — ENCOUNTER SYMPTOMS
ABDOMINAL DISTENTION: 0
BLOOD IN STOOL: 0

## 2022-05-12 ENCOUNTER — TELEPHONE (OUTPATIENT)
Dept: PRIMARY CARE CLINIC | Age: 57
End: 2022-05-12

## 2022-05-12 ENCOUNTER — HOSPITAL ENCOUNTER (OUTPATIENT)
Dept: GENERAL RADIOLOGY | Age: 57
Discharge: HOME | End: 2022-05-14
Payer: COMMERCIAL

## 2022-05-12 ENCOUNTER — OFFICE VISIT (OUTPATIENT)
Dept: FAMILY MEDICINE CLINIC | Age: 57
End: 2022-05-12
Payer: COMMERCIAL

## 2022-05-12 VITALS
BODY MASS INDEX: 28.66 KG/M2 | WEIGHT: 172 LBS | SYSTOLIC BLOOD PRESSURE: 122 MMHG | OXYGEN SATURATION: 99 % | DIASTOLIC BLOOD PRESSURE: 80 MMHG | HEART RATE: 65 BPM | TEMPERATURE: 97.2 F | HEIGHT: 65 IN

## 2022-05-12 DIAGNOSIS — S69.91XA HAND INJURY, RIGHT, INITIAL ENCOUNTER: Primary | ICD-10-CM

## 2022-05-12 DIAGNOSIS — S69.91XA HAND INJURY, RIGHT, INITIAL ENCOUNTER: ICD-10-CM

## 2022-05-12 PROCEDURE — 73130 X-RAY EXAM OF HAND: CPT

## 2022-05-12 PROCEDURE — 99213 OFFICE O/P EST LOW 20 MIN: CPT | Performed by: NURSE PRACTITIONER

## 2022-05-12 ASSESSMENT — PATIENT HEALTH QUESTIONNAIRE - PHQ9
2. FEELING DOWN, DEPRESSED OR HOPELESS: 0
SUM OF ALL RESPONSES TO PHQ9 QUESTIONS 1 & 2: 0
1. LITTLE INTEREST OR PLEASURE IN DOING THINGS: 0
SUM OF ALL RESPONSES TO PHQ QUESTIONS 1-9: 0

## 2022-05-12 ASSESSMENT — ENCOUNTER SYMPTOMS
EYES NEGATIVE: 1
GASTROINTESTINAL NEGATIVE: 1
RESPIRATORY NEGATIVE: 1
ALLERGIC/IMMUNOLOGIC NEGATIVE: 1

## 2022-05-12 NOTE — PROGRESS NOTES
Subjective:      Patient ID: Stephanie Bosch is a 62 y.o. female who presents today for:  Chief Complaint   Patient presents with    Hand Injury     right hand dorsal side of hand, x3days tx: ice, brace       HPI SUBJECTIVE:  Stephanie Bosch is a 62 y.o. female who sustained a right hand injury 3 day(s) ago. Mechanism of injury: twisted hand while dancing. Immediate symptoms: immediate pain, immediate swelling, inability to use hand directly after injury. Symptoms have been constant since that time. Prior history of related problems: no prior problems with this area in the past.    OBJECTIVE:  Vital signs as noted above. Appearance: alert, well appearing, and in no distress. Hand exam: soft tissue tenderness and swelling, and reduced range of motion, radial pulse normal, sensation normal.  X-ray: pending review by radiologist.    ASSESSMENT:  hand sprain    PLAN:  rest the injured area as much as practical, apply ice packs, elevate the injured limb, compressive bandage  See orders for this visit as documented in the electronic medical record. Past Medical History:   Diagnosis Date    Attention deficit disorder (ADD) in adult     dr Irina Meza Thyroid disease      Past Surgical History:   Procedure Laterality Date    BACK SURGERY       Social History     Socioeconomic History    Marital status:      Spouse name: Not on file    Number of children: Not on file    Years of education: Not on file    Highest education level: Not on file   Occupational History    Not on file   Tobacco Use    Smoking status: Never Smoker    Smokeless tobacco: Never Used   Substance and Sexual Activity    Alcohol use:  Yes    Drug use: Never    Sexual activity: Not on file   Other Topics Concern    Not on file   Social History Narrative    Not on file     Social Determinants of Health     Financial Resource Strain: Low Risk     Difficulty of Paying Living Expenses: Not hard at all   Food Insecurity: No Food Insecurity    Worried About [facility] in the Last Year: Never true    Blake of Food in the Last Year: Never true   Transportation Needs: No Transportation Needs    Lack of Transportation (Medical): No    Lack of Transportation (Non-Medical): No   Physical Activity:     Days of Exercise per Week: Not on file    Minutes of Exercise per Session: Not on file   Stress:     Feeling of Stress : Not on file   Social Connections:     Frequency of Communication with Friends and Family: Not on file    Frequency of Social Gatherings with Friends and Family: Not on file    Attends Druze Services: Not on file    Active Member of [address] [place] [address] or Organizations: Not on file    Attends Club or Organization Meetings: Not on file    Marital Status: Not on file   Intimate Partner Violence:     Fear of Current or Ex-Partner: Not on file    Emotionally Abused: Not on file    Physically Abused: Not on file    Sexually Abused: Not on file   Housing Stability:     Unable to Pay for Housing in the Last Year: Not on file    Number of [place] in the Last Year: Not on file    Unstable Housing in the Last Year: Not on file     History reviewed. No pertinent family history. No Known Allergies  Current Outpatient Medications   Medication Sig Dispense Refill    RESTASIS 0.05 % ophthalmic emulsion INSTILL 1 DROP INTO EACH EYE TWICE DAILY      olopatadine (PATANOL) 0.1 % ophthalmic solution 1 drop 2 times daily      lisdexamfetamine (VYVANSE) 30 MG capsule Take 1 capsule by mouth every morning for 30 days. 30 capsule 0    [START ON 6/19/2022] lisdexamfetamine (VYVANSE) 30 MG capsule Take 1 capsule by mouth every morning for 30 days. 30 capsule 0    [START ON 7/19/2022] lisdexamfetamine (VYVANSE) 30 MG capsule Take 1 capsule by mouth every morning for 30 days. 30 capsule 0    levothyroxine (SYNTHROID) 112 MCG tablet Take 1 tablet by mouth Daily 30 tablet 5     No current facility-administered medications for this visit. Review of Systems   Constitutional: Negative. HENT: Negative. Eyes: Negative. Respiratory: Negative. Cardiovascular: Negative. Gastrointestinal: Negative. Endocrine: Negative. Genitourinary: Negative. Musculoskeletal: Positive for arthralgias and joint swelling. Skin: Negative. Allergic/Immunologic: Negative. Neurological: Negative. Psychiatric/Behavioral: Negative. Objective:   /80   Pulse 65   Temp 97.2 °F (36.2 °C) (Temporal)   Ht 5' 4.5\" (1.638 m) Comment: per pt  Wt 172 lb (78 kg) Comment: per pt  LMP  (LMP Unknown)   SpO2 99%   BMI 29.07 kg/m²     Physical Exam  Constitutional:       General: She is not in acute distress. Appearance: She is well-developed. HENT:      Head: Normocephalic and atraumatic. Nose: Nose normal.   Eyes:      Pupils: Pupils are equal, round, and reactive to light. Cardiovascular:      Rate and Rhythm: Normal rate and regular rhythm. Pulses: Normal pulses. Heart sounds: Normal heart sounds. Pulmonary:      Effort: Pulmonary effort is normal.      Breath sounds: Normal breath sounds. Abdominal:      General: Bowel sounds are normal.      Palpations: Abdomen is soft. Musculoskeletal:         General: Swelling, tenderness and signs of injury present. Cervical back: Normal range of motion and neck supple. Skin:     General: Skin is warm and dry. Capillary Refill: Capillary refill takes less than 2 seconds. Neurological:      General: No focal deficit present. Mental Status: She is alert and oriented to person, place, and time. Psychiatric:         Behavior: Behavior normal.         Assessment:       Diagnosis Orders   1. Hand injury, right, initial encounter  XR HAND RIGHT (MIN 3 VIEWS)     No results found for this visit on 05/12/22. Plan:     Assessment & Plan   Pranav Solares was seen today for hand injury.     Diagnoses and all orders for this visit:    Hand injury, right, initial encounter  -     XR HAND RIGHT (MIN 3 VIEWS); Future      Orders Placed This Encounter   Procedures    XR HAND RIGHT (MIN 3 VIEWS)     Standing Status:   Future     Number of Occurrences:   1     Standing Expiration Date:   5/12/2023     Order Specific Question:   Reason for exam:     Answer:   hand injury, pain, swelling     No orders of the defined types were placed in this encounter. There are no discontinued medications. No follow-ups on file. Reviewed with the patient/family: current clinical status & medications. Side effects of the medication prescribed today, as well as treatment plan/rationale and result expectations have been discussed with the patient/family who expresses understanding. Patient will be discharged home in stable condition. Follow up with PCP to evaluate treatment results or return if symptoms worsen or fail to improve. Discussed signs and symptoms which require immediate follow-up in ED/call to 911. Understanding verbalized. I have reviewed the patient's medical history in detail and updated the computerized patient record.     Mikael Atkinson, NABOR - CNP

## 2022-05-13 ENCOUNTER — OFFICE VISIT (OUTPATIENT)
Dept: PRIMARY CARE CLINIC | Age: 57
End: 2022-05-13
Payer: COMMERCIAL

## 2022-05-13 VITALS
WEIGHT: 178 LBS | HEIGHT: 65 IN | HEART RATE: 78 BPM | RESPIRATION RATE: 18 BRPM | DIASTOLIC BLOOD PRESSURE: 78 MMHG | OXYGEN SATURATION: 98 % | BODY MASS INDEX: 29.66 KG/M2 | TEMPERATURE: 96.9 F | SYSTOLIC BLOOD PRESSURE: 124 MMHG

## 2022-05-13 DIAGNOSIS — M77.8 RIGHT WRIST TENDONITIS: Primary | ICD-10-CM

## 2022-05-13 PROCEDURE — 99213 OFFICE O/P EST LOW 20 MIN: CPT | Performed by: NURSE PRACTITIONER

## 2022-05-13 PROCEDURE — 96372 THER/PROPH/DIAG INJ SC/IM: CPT | Performed by: NURSE PRACTITIONER

## 2022-05-13 RX ORDER — KETOROLAC TROMETHAMINE 30 MG/ML
30 INJECTION, SOLUTION INTRAMUSCULAR; INTRAVENOUS ONCE
Status: COMPLETED | OUTPATIENT
Start: 2022-05-13 | End: 2022-05-13

## 2022-05-13 RX ORDER — KETOROLAC TROMETHAMINE 10 MG/1
10 TABLET, FILM COATED ORAL EVERY 6 HOURS PRN
Qty: 25 TABLET | Refills: 0 | Status: SHIPPED | OUTPATIENT
Start: 2022-05-13 | End: 2022-05-18

## 2022-05-13 RX ADMIN — KETOROLAC TROMETHAMINE 30 MG: 30 INJECTION, SOLUTION INTRAMUSCULAR; INTRAVENOUS at 12:59

## 2022-05-13 ASSESSMENT — ENCOUNTER SYMPTOMS: COLOR CHANGE: 0

## 2022-05-13 NOTE — PROGRESS NOTES
Subjective:      Patient ID: Marco Arzate is a 62 y.o. female who presents today for:  Chief Complaint   Patient presents with    Hand Pain     hand pain, had xray not broken or fracture, extreme pain, has appointment with Earl See next week,  hand pain, numbness        HPI   Patient is here with c/o right wrist pain since Sunday. Says she had been dancing and twerling on Sunday and she noticed it after that. Says after she was done dancing started aching. Says she saw chiropractor and was told something was out of place and she put it back in- saw a few days ago. Says it felt better but now worse. Reports that \"feels like something is getting stuck and she can feel something grinding\". Says she was seen at walk in yesterday and XR of the hand was negative. She has been wearing a brace. Says she is using ice and taking Aleve. Says she has been resting it. She is crying and tearful in office stating that she veins are sticking out in her hands. Says she has pressure and feels like the hand is going to pop. Past Medical History:   Diagnosis Date    Attention deficit disorder (ADD) in adult     dr Mera Robles Thyroid disease      Past Surgical History:   Procedure Laterality Date    BACK SURGERY       Social History     Socioeconomic History    Marital status:      Spouse name: Not on file    Number of children: Not on file    Years of education: Not on file    Highest education level: Not on file   Occupational History    Not on file   Tobacco Use    Smoking status: Never Smoker    Smokeless tobacco: Never Used   Substance and Sexual Activity    Alcohol use:  Yes    Drug use: Never    Sexual activity: Not on file   Other Topics Concern    Not on file   Social History Narrative    Not on file     Social Determinants of Health     Financial Resource Strain: Low Risk     Difficulty of Paying Living Expenses: Not hard at all   Food Insecurity: No Food Insecurity    Worried About Running Out of Food in the Last Year: Never true    Ran Out of Food in the Last Year: Never true   Transportation Needs: No Transportation Needs    Lack of Transportation (Medical): No    Lack of Transportation (Non-Medical): No   Physical Activity:     Days of Exercise per Week: Not on file    Minutes of Exercise per Session: Not on file   Stress:     Feeling of Stress : Not on file   Social Connections:     Frequency of Communication with Friends and Family: Not on file    Frequency of Social Gatherings with Friends and Family: Not on file    Attends [employer] Services: Not on file    Active Member of Clubs or Organizations: Not on file    Attends Club or Organization Meetings: Not on file    Marital Status: Not on file   Intimate Partner Violence:     Fear of Current or Ex-Partner: Not on file    Emotionally Abused: Not on file    Physically Abused: Not on file    Sexually Abused: Not on file   Housing Stability:     Unable to Pay for Housing in the Last Year: Not on file    Number of [place] in the Last Year: Not on file    Unstable Housing in the Last Year: Not on file     No family history on file. No Known Allergies  Current Outpatient Medications   Medication Sig Dispense Refill    ketorolac (TORADOL) 10 MG tablet Take 1 tablet by mouth every 6 hours as needed for Pain 25 tablet 0    levothyroxine (SYNTHROID) 125 MCG tablet Take 1 tablet by mouth Daily 90 tablet 1    lisdexamfetamine (VYVANSE) 30 MG capsule Take 1 capsule by mouth every morning for 30 days. 30 capsule 0    RESTASIS 0.05 % ophthalmic emulsion INSTILL 1 DROP INTO EACH EYE TWICE DAILY      olopatadine (PATANOL) 0.1 % ophthalmic solution 1 drop 2 times daily      lisdexamfetamine (VYVANSE) 30 MG capsule Take 1 capsule by mouth every morning for 30 days. 30 capsule 0    lisdexamfetamine (VYVANSE) 30 MG capsule Take 1 capsule by mouth every morning for 30 days.  30 capsule 0     No current facility-administered medications for this visit. Review of Systems   Constitutional: Positive for activity change. Negative for appetite change, chills, diaphoresis, fatigue, fever and unexpected weight change. Musculoskeletal: Positive for arthralgias (right hand) and myalgias. Skin: Negative for color change, rash and wound. Neurological: Positive for weakness and numbness. Objective:   /78   Pulse 78   Temp 96.9 °F (36.1 °C)   Resp 18   Ht 5' 4.5\" (1.638 m)   Wt 178 lb (80.7 kg)   LMP  (LMP Unknown)   SpO2 98%   BMI 30.08 kg/m²     Physical Exam  Vitals reviewed. Constitutional:       General: She is awake. She is not in acute distress. Appearance: Normal appearance. She is well-developed, well-groomed and normal weight. She is not ill-appearing, toxic-appearing or diaphoretic. HENT:      Head: Normocephalic and atraumatic. Right Ear: Hearing normal.      Left Ear: Hearing normal.   Eyes:      General: Lids are normal.      Extraocular Movements: Extraocular movements intact. Conjunctiva/sclera: Conjunctivae normal.   Cardiovascular:      Rate and Rhythm: Normal rate and regular rhythm. Pulses: Normal pulses. Heart sounds: Normal heart sounds, S1 normal and S2 normal.   Pulmonary:      Effort: Pulmonary effort is normal.      Breath sounds: Normal breath sounds and air entry. Musculoskeletal:         General: Tenderness and signs of injury present. No deformity. Right hand: Tenderness and bony tenderness present. No swelling, deformity or lacerations. Decreased range of motion. Decreased strength of wrist extension. Normal sensation. Normal capillary refill. Normal pulse. Arms:    Skin:     General: Skin is warm and dry. Capillary Refill: Capillary refill takes less than 2 seconds. Neurological:      General: No focal deficit present. Mental Status: She is alert and oriented to person, place, and time. Mental status is at baseline.    Psychiatric: Attention and Perception: Attention and perception normal.         Mood and Affect: Mood and affect normal.         Speech: Speech normal.         Behavior: Behavior normal. Behavior is cooperative. Thought Content: Thought content normal.         Cognition and Memory: Cognition and memory normal.         Judgment: Judgment normal.         Assessment:       Diagnosis Orders   1. Right wrist tendonitis  ketorolac (TORADOL) injection 30 mg    ketorolac (TORADOL) 10 MG tablet     No results found for this visit on 05/13/22. Plan:     Assessment & Plan   Deanna Escamilla was seen today for hand pain. Diagnoses and all orders for this visit:    Reviewed XR done yesterday and no abnormality seen. Advised patient likely tendonitis or ligament strain to the top of the hand. Advised to cont to use the wrist brace, ice, and elevate. Will given Toradol injection in office to help with swelling. Patient declined steroid. Will also give oral Toradol for the next few days and advised to keep follow up with PCP. If no better would consider further testing/referral to sports med. Discussed administration and SE of the medications. Advised to take with food and stop OTC NSAID if she is taking the Toradol. Right wrist tendonitis  -     ketorolac (TORADOL) injection 30 mg  -     ketorolac (TORADOL) 10 MG tablet; Take 1 tablet by mouth every 6 hours as needed for Pain      No orders of the defined types were placed in this encounter. Orders Placed This Encounter   Medications    ketorolac (TORADOL) injection 30 mg    ketorolac (TORADOL) 10 MG tablet     Sig: Take 1 tablet by mouth every 6 hours as needed for Pain     Dispense:  25 tablet     Refill:  0     There are no discontinued medications. Return for worsening of condition. Keep follow up with PCP. Reviewed with the patient/family: current clinical status & medications.   Side effects of the medication prescribed today, as well as treatment plan/rationale and result expectations have been discussed with the patient/family who expresses understanding. Patient will be discharged home in stable condition. Follow up with PCP to evaluate treatment results or return if symptoms worsen or fail to improve. Discussed signs and symptoms which require immediate follow-up in ED/call to 911. Understanding verbalized. I have reviewed the patient's medical history in detail and updated the computerized patient record.     Ana Coppola, APRN - CNP

## 2022-05-13 NOTE — PATIENT INSTRUCTIONS
Patient Education        Tendon Injury (Tendinopathy): Care Instructions  Your Care Instructions     Tendons are tough, flexible tissues that connect muscle to bone. A tendon can hurt or get torn from overuse or aging, especially tendons in the shoulder, elbow, wrist, hip, knee, or ankle. Tendon injuries may be called tendinopathy or tendinitis. Tendon injuries can occur from any motion you have to repeat vidya job, sports, or daily activities. Tennis elbow is one common tendon injury. You can treat most tendon problems with over-the-counter pain medicine, rest,changes in your activities, and physical therapy. Follow-up care is a key part of your treatment and safety. Be sure to make and go to all appointments, and call your doctor if you are having problems. It's also a good idea to know your test results and keep alist of the medicines you take. How can you care for yourself at home?  Rest the sore area. You may have to stop doing the activity that caused the tendon pain for a while.  Take an over-the-counter pain medicine, such as acetaminophen (Tylenol), ibuprofen (Advil, Motrin), or naproxen (Aleve). Read and follow all instructions on the label.  Do not take two or more pain medicines at the same time unless the doctor told you to. Many pain medicines have acetaminophen, which is Tylenol. Too much acetaminophen (Tylenol) can be harmful.  Put ice or a cold pack on the sore area for 10 to 20 minutes at a time. Try to do this every 1 to 2 hours for the next 3 days (when you are awake) or until any swelling goes down. Put a thin cloth between the ice and your skin.  Prop up the sore area on a pillow when you ice it or anytime you sit or lie down during the next 3 days. Try to keep it above the level of your heart. This will help reduce swelling.    Follow your doctor's advice for wearing and caring for a sling, splint, or cast. In some cases, you may wear one of these for a while to help your tendon heal.   Follow your doctor's advice for stretching and physical therapy. Gently move your joint through its full range of motion. This will prevent stiffness in your joint.  Go back to your activity slowly. Warm up before and stretch after the activity. You also can try making some changes. For example, if a sport caused your tendon pain, alternate the sport with another activity. If using a tool causes pain, switch hands or change your . Stop the activity if it hurts. After the activity, apply ice to prevent pain and swelling.  Do not smoke. Smoking can slow healing. If you need help quitting, talk to your doctor about stop-smoking programs and medicines. These can increase your chances of quitting for good. When should you call for help? Watch closely for changes in your health, and be sure to contact your doctor if:     Your pain gets worse.      You do not get better as expected. Where can you learn more? Go to https://Nutech Medicalpemagalis.FleAffair. org and sign in to your Phizzbo account. Enter A157 in the Venuefox box to learn more about \"Tendon Injury (Tendinopathy): Care Instructions. \"     If you do not have an account, please click on the \"Sign Up Now\" link. Current as of: July 1, 2021               Content Version: 13.2  © 2006-2022 Zingku. Care instructions adapted under license by [facility] ([facility]). If you have questions about a medical condition or this instruction, always ask your healthcare professional. [place] [address] any warranty or liability for your use of this information. Patient Education        Tenosynovitis of the Wrist: Care Instructions  Your Care Instructions  Tenosynovitis (say \"ten-oh-sin-uh-VY-tus\") means the lining of a tendon is inflamed. This problem usually affects tendons in your thumb and wrist. Atendon is a cord that joins muscle to bone. Tenosynovitis can be caused by an injury.  Or it may be caused by repeating a movement over and over, such as when you knit, lift things, or play videogames. In rare cases, an infected wound causes it. In most cases, you can recover fully. But if the problem is caused by doing something over and over and you don't stop or change doing that, it may comeback. Follow-up care is a key part of your treatment and safety. Be sure to make and go to all appointments, and call your doctor if you are having problems. It's also a good idea to know your test results and keep alist of the medicines you take. How can you care for yourself at home?  Prop up the sore wrist on a pillow when you ice it or anytime you sit or lie down during the next 3 days. Try to keep it above the level of your heart. This will help reduce swelling.  Put ice or cold packs on your wrist for 10 to 20 minutes at a time. Try to do this every 1 to 2 hours for the next 3 days (when you are awake) or until the swelling goes down. Put a thin cloth between the ice pack and your skin.  If your swelling is gone after 2 or 3 days, put a heating pad set on low or a warm cloth on your wrist for 15 to 20 minutes. This can reduce pain.  If your doctor gave you an elastic bandage, keep it on for the next 24 to 36 hours or for as long as your doctor told you. The bandage should be snug. But it should not be tight enough to cause numbness, tingling, or swelling.  If your doctor gave you a splint or brace, wear it as directed. It will protect your wrist until it is better.  Take pain medicines exactly as directed. ? If the doctor gave you a prescription medicine for pain, take it as prescribed. ? If you are not taking a prescription pain medicine, ask your doctor if you can take an over-the-counter medicine.  If your doctor prescribes antibiotics, take them as directed. Do not stop taking them just because you feel better. You need to take the full course of antibiotics.    Try not to use your injured wrist and hand.   After you are better, do exercises to make the muscles around your tendon stronger. This can prevent the problem from coming back. Follow instructions from your doctor. When should you call for help? Call your doctor now or seek immediate medical care if:     Your hand or fingers are cool or pale or change colors.      You have tingling, weakness, or numbness in your hand and fingers.      Your pain gets worse.      The tendon may be infected. Signs of infection include:  ? Increased pain and tenderness around the wrist or thumb. ? Swelling or redness of the wrist or thumb. ? A fever. Watch closely for changes in your health, and be sure to contact your doctor if:     You do not get better as expected. Where can you learn more? Go to https://Bloom Studio.Alter Eco. org and sign in to your Cape Clear Software account. Enter S715 in the CiteeCar box to learn more about \"Tenosynovitis of the Wrist: Care Instructions. \"     If you do not have an account, please click on the \"Sign Up Now\" link. Current as of: July 1, 2021               Content Version: 13.2  © 4134-4002 Moprise. Care instructions adapted under license by [facility] ([facility]). If you have questions about a medical condition or this instruction, always ask your healthcare professional. [address] any warranty or liability for your use of this information. Patient Education        Learning About RICE (Rest, Ice, Compression, and Elevation)  What is RICE? RICE is a way to care for an injury. RICE helps relieve pain and swelling. Itmay also help with healing and flexibility. RICE stands for:   R est and protect the injured or sore area.  I ce or a cold pack used as soon as possible.  C ompression, or wrapping the injured or sore area with an elastic bandage.  E levation (propping up) the injured or sore area. How do you do RICE?   You can use RICE for home treatment when you have general aches and pains orafter an injury or surgery. Rest   Do not put weight on the injury for at least 24 to 48 hours.  Use crutches for a badly sprained knee or ankle.  Support a sprained wrist, elbow, or shoulder with a sling. Ice   Put ice or a cold pack on the injury right away to reduce pain and swelling. Frozen vegetables will also work as an ice pack. Put a thin cloth between the ice or cold pack and your skin. The cloth protects the injured area from getting too cold.  Use ice for 10 to 15 minutes at a time for the first 48 to 72 hours. Compression   Use compression for sprains, strains, and surgeries of the arms and legs.  Wrap the injured area with an elastic bandage or compression sleeve to reduce swelling.  Don't wrap it too tightly. If the area below it feels numb, tingles, or feels cool, loosen the wrap. Elevation   Use elevation for areas of the body that can be propped up, such as arms and legs.  Prop up the injured area on pillows whenever you use ice. Keep it propped up anytime you sit or lie down.  Try to keep the injured area at or above the level of your heart. This will help reduce swelling and bruising. Where can you learn more? Go to https://Targeted Instant CommunicationspeAssuraMed.Szl. org and sign in to your Impactia account. Enter Z790 in the myEnergyPlatform.com box to learn more about \"Learning About RICE (Rest, Ice, Compression, and Elevation). \"     If you do not have an account, please click on the \"Sign Up Now\" link. Current as of: July 1, 2021               Content Version: 13.2  © 2006-2022 Healthwise, SprainGo. Care instructions adapted under license by [facility] ([facility]). If you have questions about a medical condition or this instruction, always ask your healthcare professional. [place] [address] any warranty or liability for your use of this information.

## 2022-05-16 DIAGNOSIS — E03.9 HYPOTHYROIDISM, UNSPECIFIED TYPE: ICD-10-CM

## 2022-05-16 LAB
T4 FREE: 1.66 NG/DL (ref 0.84–1.68)
TSH REFLEX: 0.09 UIU/ML (ref 0.44–3.86)

## 2022-05-20 ENCOUNTER — OFFICE VISIT (OUTPATIENT)
Dept: PRIMARY CARE CLINIC | Age: 57
End: 2022-05-20
Payer: COMMERCIAL

## 2022-05-20 VITALS
HEIGHT: 65 IN | OXYGEN SATURATION: 98 % | HEART RATE: 78 BPM | TEMPERATURE: 97.8 F | SYSTOLIC BLOOD PRESSURE: 122 MMHG | BODY MASS INDEX: 28.66 KG/M2 | WEIGHT: 172 LBS | DIASTOLIC BLOOD PRESSURE: 80 MMHG

## 2022-05-20 DIAGNOSIS — F98.8 ATTENTION DEFICIT DISORDER (ADD) IN ADULT: Primary | ICD-10-CM

## 2022-05-20 DIAGNOSIS — E03.9 HYPOTHYROIDISM, UNSPECIFIED TYPE: ICD-10-CM

## 2022-05-20 DIAGNOSIS — Z12.31 ENCOUNTER FOR SCREENING MAMMOGRAM FOR MALIGNANT NEOPLASM OF BREAST: ICD-10-CM

## 2022-05-20 PROCEDURE — 99213 OFFICE O/P EST LOW 20 MIN: CPT | Performed by: INTERNAL MEDICINE

## 2022-05-20 RX ORDER — LEVOTHYROXINE SODIUM 112 UG/1
120 TABLET ORAL DAILY
Qty: 30 TABLET | Refills: 5 | Status: SHIPPED | OUTPATIENT
Start: 2022-05-20 | End: 2022-08-19 | Stop reason: SDUPTHER

## 2022-05-20 ASSESSMENT — PATIENT HEALTH QUESTIONNAIRE - PHQ9
2. FEELING DOWN, DEPRESSED OR HOPELESS: 0
SUM OF ALL RESPONSES TO PHQ9 QUESTIONS 1 & 2: 0
SUM OF ALL RESPONSES TO PHQ QUESTIONS 1-9: 0
SUM OF ALL RESPONSES TO PHQ QUESTIONS 1-9: 0
1. LITTLE INTEREST OR PLEASURE IN DOING THINGS: 0
SUM OF ALL RESPONSES TO PHQ QUESTIONS 1-9: 0
SUM OF ALL RESPONSES TO PHQ QUESTIONS 1-9: 0

## 2022-05-20 NOTE — PATIENT INSTRUCTIONS
Patient Education        Dislocated Finger: Care Instructions  Your Care Instructions  When the bones of a finger are forced out of their normal position, it is called a dislocated finger. This can happen when a finger jams or bends backward. It is common during sports. A doctor can put your finger back in itsnormal position. You probably knew that something was wrong with your finger right away. This isbecause a dislocated finger usually hurts a lot. And it doesn't look straight. Your doctor may have put a splint on the finger. This will keep it in position while it heals. Your doctor may also recommend exercises to strengthen yourfinger. Rest and home treatment can also help you get better. If you damaged bones or muscles, you may need more treatment. Follow-up care is a key part of your treatment and safety. Be sure to make and go to all appointments, and call your doctor if you are having problems. It's also a good idea to know your test results and keep alist of the medicines you take. How can you care for yourself at home?  If your doctor put a splint on your finger, wear it as directed. Do not remove it until your doctor says you can.  Do not do anything that makes the pain worse.  If your finger is swollen, put ice or a cold pack on it for 10 to 20 minutes at a time. Try to do this every 1 to 2 hours for the next 3 days (when you are awake) or until the swelling goes down. Put a thin cloth between the ice and your skin.  Prop up your hand on a pillow when you ice it or anytime you sit or lie down during the next 3 days. Try to keep it above the level of your heart. This will help reduce swelling.  Take your medicines exactly as prescribed. Call your doctor if you think you are having a problem with your medicine.  Ask your doctor if you can take an over-the-counter pain medicine, such as acetaminophen (Tylenol), ibuprofen (Advil, Motrin), or naproxen (Aleve). Be safe with medicines.  Read and follow all instructions on the label.  If your doctor recommends exercises, do them as directed. When should you call for help? Call your doctor now or seek immediate medical care if:     You have new or worse pain.      Your finger is cool or pale or changes color.      You have tingling, weakness, or numbness in the finger. Watch closely for changes in your health, and be sure to contact your doctor if:     You do not get better as expected. Where can you learn more? Go to https://[facility].Vistaar. org and sign in to your Horizon Wind Energy account. Enter R117 in the RTN Stealth Software box to learn more about \"Dislocated Finger: Care Instructions. \"     If you do not have an account, please click on the \"Sign Up Now\" link. Current as of: July 1, 2021               Content Version: 13.2  © 8404-4350 Healthwise, Incorporated. Care instructions adapted under license by [facility] ([facility]). If you have questions about a medical condition or this instruction, always ask your healthcare professional. [address] any warranty or liability for your use of this information.

## 2022-05-20 NOTE — PROGRESS NOTES
Hank Vincent 62 y.o. female presents today with   Chief Complaint   Patient presents with    3 Month Follow-Up    ADD    Medication Refill       Mental Health Problem  The primary symptoms include somatic symptoms. The primary symptoms do not include hallucinations. The current episode started more than 1 month ago. This is a recurrent problem. The onset of the illness is precipitated by emotional stress and a stressful event. The degree of incapacity that she is experiencing as a consequence of her illness is moderate. Additional symptoms of the illness include attention impairment. check thyroid    Past Medical History:   Diagnosis Date    Attention deficit disorder (ADD) in adult     dr Bridgette Estrada Thyroid disease      There are no problems to display for this patient. Past Surgical History:   Procedure Laterality Date    BACK SURGERY       No family history on file. Social History     Socioeconomic History    Marital status:      Spouse name: None    Number of children: None    Years of education: None    Highest education level: None   Occupational History    None   Tobacco Use    Smoking status: Never Smoker    Smokeless tobacco: Never Used   Substance and Sexual Activity    Alcohol use: Yes    Drug use: Never    Sexual activity: None   Other Topics Concern    None   Social History Narrative    None     Social Determinants of Health     Financial Resource Strain: Low Risk     Difficulty of Paying Living Expenses: Not hard at all   Food Insecurity: No Food Insecurity    Worried About Running Out of Food in the Last Year: Never true    Blake of Food in the Last Year: Never true   Transportation Needs: No Transportation Needs    Lack of Transportation (Medical): No    Lack of Transportation (Non-Medical):  No   Physical Activity:     Days of Exercise per Week: Not on file    Minutes of Exercise per Session: Not on file   Stress:     Feeling of Stress : Not on file   Social Connections:     Frequency of Communication with Friends and Family: Not on file    Frequency of Social Gatherings with Friends and Family: Not on file    Attends Orthodoxy Services: Not on file    Active Member of Clubs or Organizations: Not on file    Attends Club or Organization Meetings: Not on file    Marital Status: Not on file   Intimate Partner Violence:     Fear of Current or Ex-Partner: Not on file    Emotionally Abused: Not on file    Physically Abused: Not on file    Sexually Abused: Not on file   Housing Stability:     Unable to Pay for Housing in the Last Year: Not on file    Number of [place] in the Last Year: Not on file    Unstable Housing in the Last Year: Not on file     No Known Allergies    Review of Systems   Constitutional: Negative for fever. HENT: Negative for facial swelling and nosebleeds. Eyes: Negative for photophobia and visual disturbance. Respiratory: Negative for apnea and choking. Cardiovascular: Negative for chest pain and palpitations. Gastrointestinal: Negative for abdominal distention and blood in stool. Genitourinary: Negative for enuresis and hematuria. Musculoskeletal: Negative for gait problem and joint swelling. Skin: Negative for rash. Neurological: Negative for syncope and speech difficulty. Hematological: Does not bruise/bleed easily. Psychiatric/Behavioral: Negative for hallucinations and suicidal ideas. Vitals:    05/20/22 0944   BP: 122/80   Site: Left Lower Arm   Cuff Size: Medium Adult   Pulse: 78   Temp: 97.8 °F (36.6 °C)   SpO2: 98%   Weight: 172 lb (78 kg)   Height: 5' 4.5\" (1.638 m)       Physical Exam  Constitutional:       Appearance: She is well-developed. HENT:      Head: Normocephalic and atraumatic. Eyes:      Pupils: Pupils are equal, round, and reactive to light. Cardiovascular:      Rate and Rhythm: Normal rate and regular rhythm. Heart sounds: Normal heart sounds.    Pulmonary:      Effort: No respiratory distress. Breath sounds: Normal breath sounds. Abdominal:      General: Bowel sounds are normal.      Palpations: Abdomen is soft. Musculoskeletal:         General: Normal range of motion. Cervical back: Normal range of motion. Skin:     Coloration: Skin is not jaundiced. Neurological:      Mental Status: She is alert and oriented to person, place, and time. Assessment/Plan  Jeanmarie Guy was seen today for 3 month follow-up, add and medication refill. Diagnoses and all orders for this visit:    Attention deficit disorder (ADD) in adult  -     lisdexamfetamine (VYVANSE) 30 MG capsule; Take 1 capsule by mouth every morning for 30 days. -     lisdexamfetamine (VYVANSE) 30 MG capsule; Take 1 capsule by mouth every morning for 30 days. -     lisdexamfetamine (VYVANSE) 30 MG capsule; Take 1 capsule by mouth every morning for 30 days. Encounter for screening mammogram for malignant neoplasm of breast  -     TEMI DIGITAL SCREEN W OR WO CAD BILATERAL; Future    Hypothyroidism, unspecified type  -     levothyroxine (SYNTHROID) 112 MCG tablet; Take 1 tablet by mouth Daily  -     TSH with Reflex; Future        Return in about 3 months (around 8/20/2022), or if symptoms worsen or fail to improve.     Zia Cabrera MD

## 2022-05-27 ASSESSMENT — ENCOUNTER SYMPTOMS
BLOOD IN STOOL: 0
ABDOMINAL DISTENTION: 0
CHOKING: 0
PHOTOPHOBIA: 0
APNEA: 0
FACIAL SWELLING: 0

## 2022-07-02 DIAGNOSIS — E03.8 OTHER SPECIFIED HYPOTHYROIDISM: Primary | ICD-10-CM

## 2022-07-15 ENCOUNTER — TELEPHONE (OUTPATIENT)
Dept: PRIMARY CARE CLINIC | Age: 57
End: 2022-07-15

## 2022-08-18 DIAGNOSIS — E03.8 OTHER SPECIFIED HYPOTHYROIDISM: ICD-10-CM

## 2022-08-18 LAB — TSH REFLEX: 0.65 UIU/ML (ref 0.44–3.86)

## 2022-08-19 ENCOUNTER — OFFICE VISIT (OUTPATIENT)
Dept: PRIMARY CARE CLINIC | Age: 57
End: 2022-08-19
Payer: COMMERCIAL

## 2022-08-19 VITALS
HEIGHT: 65 IN | TEMPERATURE: 98.1 F | HEART RATE: 89 BPM | BODY MASS INDEX: 29.16 KG/M2 | OXYGEN SATURATION: 98 % | DIASTOLIC BLOOD PRESSURE: 84 MMHG | WEIGHT: 175 LBS | SYSTOLIC BLOOD PRESSURE: 138 MMHG

## 2022-08-19 DIAGNOSIS — E03.9 HYPOTHYROIDISM, UNSPECIFIED TYPE: ICD-10-CM

## 2022-08-19 DIAGNOSIS — F98.8 ATTENTION DEFICIT DISORDER (ADD) IN ADULT: ICD-10-CM

## 2022-08-19 PROCEDURE — 99213 OFFICE O/P EST LOW 20 MIN: CPT | Performed by: INTERNAL MEDICINE

## 2022-08-19 RX ORDER — LEVOTHYROXINE SODIUM 112 UG/1
120 TABLET ORAL DAILY
Qty: 30 TABLET | Refills: 11 | Status: SHIPPED | OUTPATIENT
Start: 2022-08-19

## 2022-08-19 ASSESSMENT — ENCOUNTER SYMPTOMS
BLOOD IN STOOL: 0
FACIAL SWELLING: 0
PHOTOPHOBIA: 0
ABDOMINAL DISTENTION: 0
CHOKING: 0
APNEA: 0

## 2022-08-19 ASSESSMENT — PATIENT HEALTH QUESTIONNAIRE - PHQ9
SUM OF ALL RESPONSES TO PHQ QUESTIONS 1-9: 0
2. FEELING DOWN, DEPRESSED OR HOPELESS: 0
SUM OF ALL RESPONSES TO PHQ9 QUESTIONS 1 & 2: 0
SUM OF ALL RESPONSES TO PHQ QUESTIONS 1-9: 0
SUM OF ALL RESPONSES TO PHQ QUESTIONS 1-9: 0
1. LITTLE INTEREST OR PLEASURE IN DOING THINGS: 0
SUM OF ALL RESPONSES TO PHQ QUESTIONS 1-9: 0

## 2022-08-19 NOTE — PROGRESS NOTES
Subha Ramirez 62 y.o. female presents today with   Chief Complaint   Patient presents with    3 Month Follow-Up    ADHD    Medication Refill       Mental Health Problem  The primary symptoms include somatic symptoms. The primary symptoms do not include dysphoric mood or hallucinations. The current episode started more than 1 month ago. This is a recurrent problem. The onset of the illness is precipitated by emotional stress and a stressful event. The degree of incapacity that she is experiencing as a consequence of her illness is moderate. Additional symptoms of the illness include attention impairment. She does not admit to suicidal ideas. Risk factors that are present for mental illness include a history of mental illness. Past Medical History:   Diagnosis Date    Attention deficit disorder (ADD) in adult     dr Tammy Breen    Thyroid disease      There are no problems to display for this patient. Past Surgical History:   Procedure Laterality Date    BACK SURGERY       No family history on file. Social History     Socioeconomic History    Marital status:      Spouse name: None    Number of children: None    Years of education: None    Highest education level: None   Tobacco Use    Smoking status: Never    Smokeless tobacco: Never   Substance and Sexual Activity    Alcohol use: Yes    Drug use: Never     Social Determinants of Health     Financial Resource Strain: Low Risk     Difficulty of Paying Living Expenses: Not hard at all   Food Insecurity: No Food Insecurity    Worried About Running Out of Food in the Last Year: Never true    Ran Out of Food in the Last Year: Never true   Transportation Needs: No Transportation Needs    Lack of Transportation (Medical): No    Lack of Transportation (Non-Medical): No     No Known Allergies    Review of Systems   Constitutional:  Negative for chills and fever. HENT:  Negative for facial swelling and nosebleeds.     Eyes:  Negative for photophobia and visual disturbance. Respiratory:  Negative for apnea and choking. Cardiovascular:  Negative for chest pain and palpitations. Gastrointestinal:  Negative for abdominal distention and blood in stool. Genitourinary:  Negative for enuresis and hematuria. Musculoskeletal:  Negative for gait problem and joint swelling. Skin:  Negative for rash. Neurological:  Negative for syncope and speech difficulty. Hematological:  Does not bruise/bleed easily. Psychiatric/Behavioral:  Negative for dysphoric mood, hallucinations and suicidal ideas. Vitals:    08/19/22 0900   BP: 138/84   Site: Right Upper Arm   Cuff Size: Large Adult   Pulse: 89   Temp: 98.1 °F (36.7 °C)   SpO2: 98%   Weight: 175 lb (79.4 kg)   Height: 5' 4.5\" (1.638 m)       Physical Exam  Constitutional:       Appearance: She is well-developed. HENT:      Head: Normocephalic and atraumatic. Eyes:      Pupils: Pupils are equal, round, and reactive to light. Cardiovascular:      Rate and Rhythm: Normal rate and regular rhythm. Heart sounds: Normal heart sounds. Pulmonary:      Effort: No respiratory distress. Breath sounds: Normal breath sounds. Abdominal:      General: Bowel sounds are normal.   Musculoskeletal:         General: Normal range of motion. Cervical back: Normal range of motion. Skin:     Coloration: Skin is not jaundiced. Neurological:      Mental Status: She is alert and oriented to person, place, and time. Cranial Nerves: No cranial nerve deficit. Psychiatric:         Mood and Affect: Mood normal.      Assessment/Plan  Helio Jaramillo was seen today for 3 month follow-up, adhd and medication refill. Diagnoses and all orders for this visit:    Attention deficit disorder (ADD) in adult  -     lisdexamfetamine (VYVANSE) 30 MG capsule; Take 1 capsule by mouth every morning for 30 days. -     lisdexamfetamine (VYVANSE) 30 MG capsule; Take 1 capsule by mouth every morning for 30 days.   -     lisdexamfetamine (VYVANSE) 30 MG capsule; Take 1 capsule by mouth every morning for 30 days. Hypothyroidism, unspecified type  -     levothyroxine (SYNTHROID) 112 MCG tablet; Take 1 tablet by mouth Daily    Controlled Substances Monitoring: Periodic Controlled Substance Monitoring: Possible medication side effects, risk of tolerance/dependence & alternative treatments discussed., No signs of potential drug abuse or diversion identified. , Assessed functional status. Nieves Santana MD)    Return in about 3 months (around 11/19/2022), or if symptoms worsen or fail to improve.     Nieves Santana MD

## 2022-11-02 ENCOUNTER — TELEPHONE (OUTPATIENT)
Dept: PRIMARY CARE CLINIC | Age: 57
End: 2022-11-02

## 2022-11-02 NOTE — TELEPHONE ENCOUNTER
Called pt to schedule routine mammogram, and she said \"nope she aint doing that\", I advised there is an order in there if she changes her mind, but she again said nope she aint doing that.

## 2022-11-21 ENCOUNTER — OFFICE VISIT (OUTPATIENT)
Dept: PRIMARY CARE CLINIC | Age: 57
End: 2022-11-21
Payer: COMMERCIAL

## 2022-11-21 VITALS
HEART RATE: 84 BPM | OXYGEN SATURATION: 97 % | DIASTOLIC BLOOD PRESSURE: 84 MMHG | HEIGHT: 65 IN | SYSTOLIC BLOOD PRESSURE: 132 MMHG | BODY MASS INDEX: 29.32 KG/M2 | WEIGHT: 176 LBS

## 2022-11-21 DIAGNOSIS — Z12.31 SCREENING MAMMOGRAM FOR BREAST CANCER: ICD-10-CM

## 2022-11-21 DIAGNOSIS — F98.8 ATTENTION DEFICIT DISORDER (ADD) IN ADULT: Primary | ICD-10-CM

## 2022-11-21 PROCEDURE — 99213 OFFICE O/P EST LOW 20 MIN: CPT | Performed by: INTERNAL MEDICINE

## 2022-11-21 ASSESSMENT — PATIENT HEALTH QUESTIONNAIRE - PHQ9
SUM OF ALL RESPONSES TO PHQ QUESTIONS 1-9: 0
1. LITTLE INTEREST OR PLEASURE IN DOING THINGS: 0
SUM OF ALL RESPONSES TO PHQ9 QUESTIONS 1 & 2: 0
SUM OF ALL RESPONSES TO PHQ QUESTIONS 1-9: 0
2. FEELING DOWN, DEPRESSED OR HOPELESS: 0

## 2022-11-21 ASSESSMENT — ENCOUNTER SYMPTOMS
APNEA: 0
CHOKING: 0
FACIAL SWELLING: 0
ABDOMINAL DISTENTION: 0
PHOTOPHOBIA: 0
BLOOD IN STOOL: 0

## 2022-11-21 NOTE — PROGRESS NOTES
Barbie Lepe 62 y.o. female presents today with   Chief Complaint   Patient presents with    3 Month Follow-Up    ADHD    Medication Refill       ADHD  This is a chronic problem. The current episode started more than 1 year ago. The problem occurs daily. The problem has been waxing and waning. Past Medical History:   Diagnosis Date    Attention deficit disorder (ADD) in adult     dr Michelle Stevens    Thyroid disease      There are no problems to display for this patient. Past Surgical History:   Procedure Laterality Date    BACK SURGERY       No family history on file. Social History     Socioeconomic History    Marital status:      Spouse name: None    Number of children: None    Years of education: None    Highest education level: None   Tobacco Use    Smoking status: Never    Smokeless tobacco: Never   Substance and Sexual Activity    Alcohol use: Yes    Drug use: Never     Social Determinants of Health     Financial Resource Strain: Low Risk     Difficulty of Paying Living Expenses: Not hard at all   Food Insecurity: No Food Insecurity    Worried About Running Out of Food in the Last Year: Never true    Ran Out of Food in the Last Year: Never true   Transportation Needs: No Transportation Needs    Lack of Transportation (Medical): No    Lack of Transportation (Non-Medical): No     No Known Allergies    Review of Systems   HENT:  Negative for facial swelling and nosebleeds. Eyes:  Negative for photophobia and visual disturbance. Respiratory:  Negative for apnea and choking. Cardiovascular:  Negative for palpitations. Gastrointestinal:  Negative for abdominal distention and blood in stool. Genitourinary:  Negative for enuresis, hematuria and vaginal bleeding. Musculoskeletal:  Negative for gait problem. Neurological:  Negative for syncope and speech difficulty. Hematological:  Does not bruise/bleed easily. Psychiatric/Behavioral:  Positive for decreased concentration.  Negative for hallucinations and suicidal ideas. Vitals:    11/21/22 0933   BP: 132/84   Pulse: 84   SpO2: 97%   Weight: 176 lb (79.8 kg)   Height: 5' 4.5\" (1.638 m)       Physical Exam  Constitutional:       Appearance: She is well-developed. HENT:      Head: Normocephalic. Eyes:      Conjunctiva/sclera: Conjunctivae normal.   Cardiovascular:      Rate and Rhythm: Normal rate and regular rhythm. Heart sounds: Normal heart sounds. Pulmonary:      Effort: No respiratory distress. Breath sounds: Normal breath sounds. No wheezing. Abdominal:      General: There is no distension. Musculoskeletal:         General: Normal range of motion. Cervical back: Normal range of motion. Skin:     Coloration: Skin is not jaundiced. Neurological:      Mental Status: She is alert and oriented to person, place, and time. Cranial Nerves: No cranial nerve deficit. Psychiatric:         Mood and Affect: Mood normal.      Assessment/Plan  Adam Palmer was seen today for 3 month follow-up, adhd and medication refill. Diagnoses and all orders for this visit:    Attention deficit disorder (ADD) in adult  -     lisdexamfetamine (VYVANSE) 30 MG capsule; Take 1 capsule by mouth every morning for 30 days. -     lisdexamfetamine (VYVANSE) 30 MG capsule; Take 1 capsule by mouth every morning for 30 days. -     lisdexamfetamine (VYVANSE) 30 MG capsule; Take 1 capsule by mouth every morning for 30 days. Screening mammogram for breast cancer  -     TEMI DIGITAL SCREEN W OR WO CAD BILATERAL; Future    Controlled Substances Monitoring: Periodic Controlled Substance Monitoring: Possible medication side effects, risk of tolerance/dependence & alternative treatments discussed., No signs of potential drug abuse or diversion identified. , Assessed functional status. Rosalio Srinivasan MD)    Return in about 3 months (around 2/21/2023), or if symptoms worsen or fail to improve.     Rosalio Srinivasan MD

## 2022-11-23 ENCOUNTER — TELEPHONE (OUTPATIENT)
Dept: PRIMARY CARE CLINIC | Age: 57
End: 2022-11-23

## 2023-02-21 ENCOUNTER — OFFICE VISIT (OUTPATIENT)
Dept: PRIMARY CARE CLINIC | Age: 58
End: 2023-02-21
Payer: COMMERCIAL

## 2023-02-21 VITALS
WEIGHT: 184 LBS | OXYGEN SATURATION: 98 % | HEIGHT: 65 IN | DIASTOLIC BLOOD PRESSURE: 80 MMHG | HEART RATE: 67 BPM | SYSTOLIC BLOOD PRESSURE: 130 MMHG | BODY MASS INDEX: 30.66 KG/M2

## 2023-02-21 DIAGNOSIS — F98.8 ATTENTION DEFICIT DISORDER (ADD) IN ADULT: ICD-10-CM

## 2023-02-21 PROCEDURE — 99213 OFFICE O/P EST LOW 20 MIN: CPT | Performed by: INTERNAL MEDICINE

## 2023-02-21 SDOH — ECONOMIC STABILITY: INCOME INSECURITY: HOW HARD IS IT FOR YOU TO PAY FOR THE VERY BASICS LIKE FOOD, HOUSING, MEDICAL CARE, AND HEATING?: NOT HARD AT ALL

## 2023-02-21 SDOH — ECONOMIC STABILITY: FOOD INSECURITY: WITHIN THE PAST 12 MONTHS, THE FOOD YOU BOUGHT JUST DIDN'T LAST AND YOU DIDN'T HAVE MONEY TO GET MORE.: NEVER TRUE

## 2023-02-21 SDOH — ECONOMIC STABILITY: HOUSING INSECURITY
IN THE LAST 12 MONTHS, WAS THERE A TIME WHEN YOU DID NOT HAVE A STEADY PLACE TO SLEEP OR SLEPT IN A SHELTER (INCLUDING NOW)?: NO

## 2023-02-21 SDOH — ECONOMIC STABILITY: FOOD INSECURITY: WITHIN THE PAST 12 MONTHS, YOU WORRIED THAT YOUR FOOD WOULD RUN OUT BEFORE YOU GOT MONEY TO BUY MORE.: NEVER TRUE

## 2023-02-21 ASSESSMENT — PATIENT HEALTH QUESTIONNAIRE - PHQ9
SUM OF ALL RESPONSES TO PHQ QUESTIONS 1-9: 0
SUM OF ALL RESPONSES TO PHQ QUESTIONS 1-9: 0
SUM OF ALL RESPONSES TO PHQ9 QUESTIONS 1 & 2: 0
SUM OF ALL RESPONSES TO PHQ QUESTIONS 1-9: 0
SUM OF ALL RESPONSES TO PHQ QUESTIONS 1-9: 0
1. LITTLE INTEREST OR PLEASURE IN DOING THINGS: 0
2. FEELING DOWN, DEPRESSED OR HOPELESS: 0

## 2023-02-21 NOTE — PROGRESS NOTES
Kelsey Record 62 y.o. female presents today with   Chief Complaint   Patient presents with    3 Month Follow-Up    ADHD    Medication Refill       ADD  This is a chronic problem. The current episode started more than 1 year ago. The problem occurs daily. The problem has been waxing and waning. Pertinent negatives include no abdominal pain, chest pain, chills, fever, joint swelling or rash. Past Medical History:   Diagnosis Date    Attention deficit disorder (ADD) in adult     dr Radha Christina    Thyroid disease      There are no problems to display for this patient. Past Surgical History:   Procedure Laterality Date    BACK SURGERY       No family history on file. Social History     Socioeconomic History    Marital status:      Spouse name: None    Number of children: None    Years of education: None    Highest education level: None   Tobacco Use    Smoking status: Never    Smokeless tobacco: Never   Substance and Sexual Activity    Alcohol use: Yes    Drug use: Never     Social Determinants of Health     Financial Resource Strain: Low Risk     Difficulty of Paying Living Expenses: Not hard at all   Food Insecurity: No Food Insecurity    Worried About Running Out of Food in the Last Year: Never true    Ran Out of Food in the Last Year: Never true   Transportation Needs: Unknown    Lack of Transportation (Non-Medical): No   Housing Stability: Unknown    Unstable Housing in the Last Year: No     No Known Allergies    Review of Systems   Constitutional:  Negative for chills and fever. HENT:  Negative for facial swelling and nosebleeds. Eyes:  Negative for photophobia and visual disturbance. Respiratory:  Negative for apnea and choking. Cardiovascular:  Negative for chest pain and palpitations. Gastrointestinal:  Negative for abdominal distention, abdominal pain and blood in stool. Genitourinary:  Negative for enuresis, hematuria and vaginal bleeding.    Musculoskeletal:  Negative for gait problem and joint swelling. Skin:  Negative for rash. Neurological:  Negative for syncope and speech difficulty. Hematological:  Does not bruise/bleed easily. Psychiatric/Behavioral:  Positive for decreased concentration. Negative for hallucinations and suicidal ideas. Vitals:    02/21/23 1544   BP: 130/80   Pulse: 67   SpO2: 98%   Weight: 184 lb (83.5 kg)   Height: 5' 4.5\" (1.638 m)       Physical Exam  Constitutional:       Appearance: She is well-developed. HENT:      Head: Normocephalic. Eyes:      Conjunctiva/sclera: Conjunctivae normal.   Cardiovascular:      Rate and Rhythm: Normal rate and regular rhythm. Heart sounds: Normal heart sounds. Pulmonary:      Breath sounds: Normal breath sounds. Abdominal:      General: There is no distension. Musculoskeletal:         General: Normal range of motion. Cervical back: Normal range of motion. Neurological:      Mental Status: She is alert and oriented to person, place, and time. Assessment/Plan  Donis Calderón was seen today for 3 month follow-up, adhd and medication refill. Diagnoses and all orders for this visit:    Attention deficit disorder (ADD) in adult  -     lisdexamfetamine (VYVANSE) 30 MG capsule; Take 1 capsule by mouth every morning for 30 days. -     lisdexamfetamine (VYVANSE) 30 MG capsule; Take 1 capsule by mouth every morning for 30 days. -     lisdexamfetamine (VYVANSE) 30 MG capsule; Take 1 capsule by mouth every morning for 30 days. Controlled Substances Monitoring: Periodic Controlled Substance Monitoring: Possible medication side effects, risk of tolerance/dependence & alternative treatments discussed., No signs of potential drug abuse or diversion identified. , Assessed functional status. Lourdes Mohs, MD)    No follow-ups on file.     Lourdes Mohs, MD

## 2023-02-27 ASSESSMENT — ENCOUNTER SYMPTOMS
ABDOMINAL PAIN: 0
FACIAL SWELLING: 0
ABDOMINAL DISTENTION: 0
PHOTOPHOBIA: 0
CHOKING: 0
APNEA: 0
BLOOD IN STOOL: 0

## 2023-03-30 ENCOUNTER — OFFICE VISIT (OUTPATIENT)
Dept: PRIMARY CARE CLINIC | Age: 58
End: 2023-03-30
Payer: COMMERCIAL

## 2023-03-30 VITALS
HEART RATE: 86 BPM | OXYGEN SATURATION: 97 % | DIASTOLIC BLOOD PRESSURE: 88 MMHG | HEIGHT: 65 IN | WEIGHT: 180 LBS | SYSTOLIC BLOOD PRESSURE: 138 MMHG | BODY MASS INDEX: 29.99 KG/M2

## 2023-03-30 DIAGNOSIS — M54.9 UPPER BACK PAIN ON RIGHT SIDE: Primary | ICD-10-CM

## 2023-03-30 PROCEDURE — 99213 OFFICE O/P EST LOW 20 MIN: CPT | Performed by: INTERNAL MEDICINE

## 2023-03-30 RX ORDER — OXYCODONE HYDROCHLORIDE AND ACETAMINOPHEN 5; 325 MG/1; MG/1
1 TABLET ORAL EVERY 4 HOURS PRN
Qty: 28 TABLET | Refills: 0 | Status: SHIPPED | OUTPATIENT
Start: 2023-03-30 | End: 2023-04-06

## 2023-03-30 RX ORDER — CYCLOBENZAPRINE HCL 10 MG
10 TABLET ORAL 3 TIMES DAILY PRN
Qty: 90 TABLET | Refills: 0 | Status: SHIPPED | OUTPATIENT
Start: 2023-03-30 | End: 2023-04-29

## 2023-03-30 RX ORDER — NAPROXEN 500 MG/1
500 TABLET ORAL 2 TIMES DAILY
Qty: 60 TABLET | Refills: 1 | Status: SHIPPED | OUTPATIENT
Start: 2023-03-30 | End: 2023-05-29

## 2023-03-30 SDOH — ECONOMIC STABILITY: FOOD INSECURITY: WITHIN THE PAST 12 MONTHS, THE FOOD YOU BOUGHT JUST DIDN'T LAST AND YOU DIDN'T HAVE MONEY TO GET MORE.: NEVER TRUE

## 2023-03-30 SDOH — ECONOMIC STABILITY: INCOME INSECURITY: HOW HARD IS IT FOR YOU TO PAY FOR THE VERY BASICS LIKE FOOD, HOUSING, MEDICAL CARE, AND HEATING?: NOT HARD AT ALL

## 2023-03-30 SDOH — ECONOMIC STABILITY: FOOD INSECURITY: WITHIN THE PAST 12 MONTHS, YOU WORRIED THAT YOUR FOOD WOULD RUN OUT BEFORE YOU GOT MONEY TO BUY MORE.: NEVER TRUE

## 2023-03-30 ASSESSMENT — PATIENT HEALTH QUESTIONNAIRE - PHQ9
2. FEELING DOWN, DEPRESSED OR HOPELESS: 0
1. LITTLE INTEREST OR PLEASURE IN DOING THINGS: 0
SUM OF ALL RESPONSES TO PHQ QUESTIONS 1-9: 0
SUM OF ALL RESPONSES TO PHQ QUESTIONS 1-9: 0
SUM OF ALL RESPONSES TO PHQ9 QUESTIONS 1 & 2: 0
SUM OF ALL RESPONSES TO PHQ QUESTIONS 1-9: 0
SUM OF ALL RESPONSES TO PHQ QUESTIONS 1-9: 0

## 2023-03-30 ASSESSMENT — ENCOUNTER SYMPTOMS
FACIAL SWELLING: 0
ABDOMINAL DISTENTION: 0
BACK PAIN: 1
CHOKING: 0
BLOOD IN STOOL: 0
PHOTOPHOBIA: 0
APNEA: 0

## 2023-03-30 NOTE — PROGRESS NOTES
enuresis, hematuria and vaginal bleeding. Musculoskeletal:  Positive for back pain. Negative for gait problem and joint swelling. Skin:  Negative for rash. Neurological:  Negative for syncope and speech difficulty. Hematological:  Does not bruise/bleed easily. Psychiatric/Behavioral:  Negative for hallucinations and suicidal ideas. Vitals:    03/30/23 1632   BP: 138/88   Pulse: 86   SpO2: 97%   Weight: 180 lb (81.6 kg)   Height: 5' 4.5\" (1.638 m)       Physical Exam  Constitutional:       Appearance: She is well-developed. HENT:      Head: Normocephalic. Eyes:      Conjunctiva/sclera: Conjunctivae normal.   Cardiovascular:      Rate and Rhythm: Normal rate and regular rhythm. Heart sounds: Normal heart sounds. Pulmonary:      Breath sounds: Normal breath sounds. No wheezing or rales. Abdominal:      General: There is no distension. Musculoskeletal:         General: Normal range of motion. Cervical back: Normal range of motion. Skin:     Coloration: Skin is not jaundiced. Neurological:      Mental Status: She is alert and oriented to person, place, and time. Psychiatric:         Mood and Affect: Mood normal.     Assessment/Plan  Joe Nicole was seen today for neck pain. Diagnoses and all orders for this visit:    Upper back pain on right side  -     oxyCODONE-acetaminophen (PERCOCET) 5-325 MG per tablet; Take 1 tablet by mouth every 4 hours as needed for Pain for up to 7 days. Max Daily Amount: 6 tablets  -     cyclobenzaprine (FLEXERIL) 10 MG tablet; Take 1 tablet by mouth 3 times daily as needed for Muscle spasms  -     naproxen (NAPROSYN) 500 MG tablet; Take 1 tablet by mouth 2 times daily      Return in about 3 months (around 6/30/2023), or if symptoms worsen or fail to improve.     Jerrell Sanderson MD

## 2023-05-23 ENCOUNTER — OFFICE VISIT (OUTPATIENT)
Dept: PRIMARY CARE CLINIC | Age: 58
End: 2023-05-23
Payer: COMMERCIAL

## 2023-05-23 VITALS
HEART RATE: 75 BPM | OXYGEN SATURATION: 98 % | SYSTOLIC BLOOD PRESSURE: 124 MMHG | HEIGHT: 65 IN | DIASTOLIC BLOOD PRESSURE: 76 MMHG | WEIGHT: 181 LBS | BODY MASS INDEX: 30.16 KG/M2

## 2023-05-23 DIAGNOSIS — Z51.81 THERAPEUTIC DRUG MONITORING: ICD-10-CM

## 2023-05-23 DIAGNOSIS — F98.8 ATTENTION DEFICIT DISORDER (ADD) IN ADULT: Primary | ICD-10-CM

## 2023-05-23 PROCEDURE — 99213 OFFICE O/P EST LOW 20 MIN: CPT | Performed by: INTERNAL MEDICINE

## 2023-05-23 ASSESSMENT — ENCOUNTER SYMPTOMS
CHOKING: 0
BLOOD IN STOOL: 0
APNEA: 0
FACIAL SWELLING: 0
ABDOMINAL PAIN: 0
PHOTOPHOBIA: 0
ABDOMINAL DISTENTION: 0

## 2023-05-26 LAB
6MAM UR QL: NOT DETECTED
7-AMINOCLONAZEPAM: NOT DETECTED
ALPHA-OH-ALPRAZOLAM: NOT DETECTED
ALPHA-OH-MIDAZOLAM, URINE: NOT DETECTED
ALPRAZOLAM: NOT DETECTED
AMPHET UR QL SCN: PRESENT
BARBITURATES: NOT DETECTED
BENZOYLECGONINE: NOT DETECTED
BUPRENORPHINE: NOT DETECTED
CARISOPRODOL UR QL: NOT DETECTED
CLONAZEPAM UR QL: NOT DETECTED
CODEINE, OPI4M: NOT DETECTED
CREAT UR-MCNC: 244.3 MG/DL (ref 20–400)
DIAZEPAM: NOT DETECTED
ETHYL GLUCURONIDE: PRESENT
FENTANYL UR QL: NOT DETECTED
GABAPENTIN: NOT DETECTED
HYDROCODONE UR QL: NOT DETECTED
HYDROMORPHONE: NOT DETECTED
LORAZEPAM UR QL: NOT DETECTED
MARIJUANA METABOLITE: PRESENT
MDA: NOT DETECTED
MDEA: NOT DETECTED
MDMA UR QL: NOT DETECTED
MEPERIDINE: NOT DETECTED
METHADONE: NOT DETECTED
METHAMPHETAMINE: NOT DETECTED
METHYLPHENIDATE: NOT DETECTED
MIDAZOLAM UR QL SCN: NOT DETECTED
MORPHINE: NOT DETECTED
NALOXONE: NOT DETECTED
NORBUPRENORPHINE, FREE: NOT DETECTED
NORDIAZEPAM: NOT DETECTED
NORFENTANYL: NOT DETECTED
NORHYDROCODONE, URINE: NOT DETECTED
NOROXYCODONE: NOT DETECTED
NOROXYMORPHONE, URINE: NOT DETECTED
OXAZEPAM UR QL: NOT DETECTED
OXYCODONE UR QL: NOT DETECTED
OXYMORPHONE UR QL: NOT DETECTED
PAIN MANAGEMENT DRUG PANEL: NORMAL
PATHOLOGY STUDY: NORMAL
PCP: NOT DETECTED
PHENTERMINE: NOT DETECTED
PREGABALIN: NOT DETECTED
TAPENTADOL, URINE: NOT DETECTED
TAPENTADOL-O-SULFATE, URINE: NOT DETECTED
TEMAZEPAM: NOT DETECTED
TRAMADOL: NOT DETECTED
ZOLPIDEM: NOT DETECTED

## 2023-07-07 ENCOUNTER — TELEPHONE (OUTPATIENT)
Dept: PRIMARY CARE CLINIC | Age: 58
End: 2023-07-07

## 2023-07-21 ENCOUNTER — OFFICE VISIT (OUTPATIENT)
Dept: PRIMARY CARE CLINIC | Age: 58
End: 2023-07-21
Payer: COMMERCIAL

## 2023-07-21 VITALS
TEMPERATURE: 97.1 F | HEART RATE: 71 BPM | HEIGHT: 65 IN | OXYGEN SATURATION: 98 % | RESPIRATION RATE: 18 BRPM | DIASTOLIC BLOOD PRESSURE: 68 MMHG | SYSTOLIC BLOOD PRESSURE: 118 MMHG | WEIGHT: 181 LBS | BODY MASS INDEX: 30.16 KG/M2

## 2023-07-21 DIAGNOSIS — L02.92 FURUNCLE: Primary | ICD-10-CM

## 2023-07-21 PROCEDURE — 10060 I&D ABSCESS SIMPLE/SINGLE: CPT | Performed by: INTERNAL MEDICINE

## 2023-07-21 PROCEDURE — 99213 OFFICE O/P EST LOW 20 MIN: CPT | Performed by: INTERNAL MEDICINE

## 2023-07-21 RX ORDER — SULFAMETHOXAZOLE AND TRIMETHOPRIM 800; 160 MG/1; MG/1
1 TABLET ORAL 2 TIMES DAILY
Qty: 14 TABLET | Refills: 0 | Status: SHIPPED | OUTPATIENT
Start: 2023-07-21 | End: 2023-07-28

## 2023-07-21 NOTE — PROGRESS NOTES
Subjective:      Patient ID: Chema Nam is a 62 y.o. female    Groin lump x 4 days   HPI  Pt presents with 4 days of painful lump in the left groin. Initially nonpainful since 1 month ago but became red and painful 4 days ago. Attests to pubic area shaving. Past Medical History:   Diagnosis Date    Anxiety     gummy bears occ. stress at work. Attention deficit disorder (ADD) in adult     dr Cher Stephenson    Thyroid disease      Past Surgical History:   Procedure Laterality Date    BACK SURGERY       Social History     Socioeconomic History    Marital status:      Spouse name: Not on file    Number of children: Not on file    Years of education: Not on file    Highest education level: Not on file   Occupational History    Not on file   Tobacco Use    Smoking status: Never    Smokeless tobacco: Never   Substance and Sexual Activity    Alcohol use: Yes    Drug use: Never    Sexual activity: Not on file   Other Topics Concern    Not on file   Social History Narrative    Not on file     Social Determinants of Health     Financial Resource Strain: Low Risk     Difficulty of Paying Living Expenses: Not hard at all   Food Insecurity: No Food Insecurity    Worried About Running Out of Food in the Last Year: Never true    [address] in the Last Year: Never true   Transportation Needs: Unknown    Lack of Transportation (Medical): Not on file    Lack of Transportation (Non-Medical): No   Physical Activity: Not on file   Stress: Not on file   Social Connections: Not on file   Intimate Partner Violence: Not on file   Housing Stability: Unknown    Unable to Pay for Housing in the Last Year: Not on file    Number of Places Lived in the Last Year: Not on file    Unstable Housing in the Last Year: No     History reviewed. No pertinent family history. Allergies:  Patient has no known allergies. There is no problem list on file for this patient.     Current Outpatient Medications on File Prior to Visit   Medication Sig

## 2023-07-23 ASSESSMENT — ENCOUNTER SYMPTOMS
DIARRHEA: 0
COLOR CHANGE: 1
SHORTNESS OF BREATH: 0
WHEEZING: 0
NAUSEA: 0
VOMITING: 0
COUGH: 0
ABDOMINAL PAIN: 0

## 2023-08-22 ENCOUNTER — OFFICE VISIT (OUTPATIENT)
Dept: PRIMARY CARE CLINIC | Age: 58
End: 2023-08-22
Payer: COMMERCIAL

## 2023-08-22 VITALS
BODY MASS INDEX: 29.66 KG/M2 | WEIGHT: 178 LBS | OXYGEN SATURATION: 98 % | HEART RATE: 80 BPM | HEIGHT: 65 IN | SYSTOLIC BLOOD PRESSURE: 118 MMHG | DIASTOLIC BLOOD PRESSURE: 82 MMHG

## 2023-08-22 DIAGNOSIS — E03.9 HYPOTHYROIDISM, UNSPECIFIED TYPE: ICD-10-CM

## 2023-08-22 DIAGNOSIS — F98.8 ATTENTION DEFICIT DISORDER (ADD) IN ADULT: ICD-10-CM

## 2023-08-22 PROCEDURE — 99213 OFFICE O/P EST LOW 20 MIN: CPT | Performed by: INTERNAL MEDICINE

## 2023-08-22 RX ORDER — LEVOTHYROXINE SODIUM 112 UG/1
120 TABLET ORAL DAILY
Qty: 30 TABLET | Refills: 11 | Status: SHIPPED | OUTPATIENT
Start: 2023-08-22

## 2023-08-22 ASSESSMENT — ENCOUNTER SYMPTOMS
CHOKING: 0
ABDOMINAL DISTENTION: 0
ABDOMINAL PAIN: 0
APNEA: 0
FACIAL SWELLING: 0
BLOOD IN STOOL: 0
PHOTOPHOBIA: 0

## 2023-08-22 NOTE — PROGRESS NOTES
MD)    Return in about 3 months (around 11/22/2023), or if symptoms worsen or fail to improve.     Aron Nguyễn MD

## 2023-09-18 ENCOUNTER — OFFICE VISIT (OUTPATIENT)
Dept: PRIMARY CARE CLINIC | Age: 58
End: 2023-09-18
Payer: COMMERCIAL

## 2023-09-18 VITALS
HEIGHT: 65 IN | WEIGHT: 177.2 LBS | BODY MASS INDEX: 29.52 KG/M2 | SYSTOLIC BLOOD PRESSURE: 116 MMHG | TEMPERATURE: 98.3 F | HEART RATE: 84 BPM | OXYGEN SATURATION: 95 % | RESPIRATION RATE: 18 BRPM | DIASTOLIC BLOOD PRESSURE: 68 MMHG

## 2023-09-18 DIAGNOSIS — J20.9 ACUTE BRONCHITIS, UNSPECIFIED ORGANISM: Primary | ICD-10-CM

## 2023-09-18 PROCEDURE — 99213 OFFICE O/P EST LOW 20 MIN: CPT | Performed by: INTERNAL MEDICINE

## 2023-09-18 RX ORDER — GUAIFENESIN AND CODEINE PHOSPHATE 100; 10 MG/5ML; MG/5ML
10 SOLUTION ORAL 2 TIMES DAILY PRN
Qty: 118 ML | Refills: 0 | Status: SHIPPED | OUTPATIENT
Start: 2023-09-18 | End: 2023-09-25

## 2023-09-18 RX ORDER — ALBUTEROL SULFATE 90 UG/1
2 AEROSOL, METERED RESPIRATORY (INHALATION) 4 TIMES DAILY PRN
Qty: 54 G | Refills: 1 | Status: SHIPPED | OUTPATIENT
Start: 2023-09-18

## 2023-09-18 RX ORDER — ALBUTEROL SULFATE 2.5 MG/3ML
2.5 SOLUTION RESPIRATORY (INHALATION) 4 TIMES DAILY PRN
Qty: 120 EACH | Refills: 3 | Status: SHIPPED | OUTPATIENT
Start: 2023-09-18

## 2023-09-18 RX ORDER — BENZONATATE 100 MG/1
100 CAPSULE ORAL 3 TIMES DAILY PRN
Qty: 30 CAPSULE | Refills: 0 | Status: SHIPPED | OUTPATIENT
Start: 2023-09-18

## 2023-09-18 ASSESSMENT — ENCOUNTER SYMPTOMS
VOMITING: 0
WHEEZING: 0
SINUS PAIN: 0
SORE THROAT: 0
NAUSEA: 0
RHINORRHEA: 0
SINUS PRESSURE: 0
ABDOMINAL PAIN: 0
COUGH: 1
DIARRHEA: 0
SHORTNESS OF BREATH: 0

## 2023-09-18 NOTE — PROGRESS NOTES
Subjective:      Patient ID: Angeles Segal is a 62 y.o. female    Cough x 2 weeks   HPI  Pt presents with 2 weeks of cough productive of clear sputum. No assoc chest pain but attests to chest congestion. No SOB, but attests to wheezing, no fever or chills. No generalized body pain, no sore throat, no nausea or vomiting. No help from  inhaler. Past Medical History:   Diagnosis Date    Anxiety     gummy bears occ. stress at work. Attention deficit disorder (ADD) in adult     dr India Soler    Thyroid disease      Past Surgical History:   Procedure Laterality Date    BACK SURGERY       Social History     Socioeconomic History    Marital status:      Spouse name: Not on file    Number of children: Not on file    Years of education: Not on file    Highest education level: Not on file   Occupational History    Not on file   Tobacco Use    Smoking status: Never    Smokeless tobacco: Never   Substance and Sexual Activity    Alcohol use: Yes    Drug use: Never    Sexual activity: Not on file   Other Topics Concern    Not on file   Social History Narrative    Not on file     Social Determinants of Health     Financial Resource Strain: Low Risk  (3/30/2023)    Overall Financial Resource Strain (CARDIA)     Difficulty of Paying Living Expenses: Not hard at all   Food Insecurity: No Food Insecurity (3/30/2023)    Hunger Vital Sign     Worried About Running Out of Food in the Last Year: Never true     [address] in the Last Year: Never true   Transportation Needs: Unknown (3/30/2023)    PRAPARE - Transportation     Lack of Transportation (Medical): Not on file     Lack of Transportation (Non-Medical):  No   Physical Activity: Not on file   Stress: Not on file   Social Connections: Not on file   Intimate Partner Violence: Not on file   Housing Stability: Unknown (3/30/2023)    Housing Stability Vital Sign     Unable to Pay for Housing in the Last Year: Not on file     Number of Places Lived in the Last Year: Not

## 2023-09-20 ENCOUNTER — TELEPHONE (OUTPATIENT)
Dept: PRIMARY CARE CLINIC | Age: 58
End: 2023-09-20

## 2023-09-20 NOTE — TELEPHONE ENCOUNTER
----- Message from [employer] sent at 9/20/2023 10:01 AM EDT -----  Subject: Message to Provider    QUESTIONS  Information for Provider? Patient was hoping Izabel could call in a   prescription in case her coughing gets worse because she cannot keep   coming in.  ---------------------------------------------------------------------------  --------------  [employer]  [phone]; OK to leave message on voicemail  ---------------------------------------------------------------------------  --------------  SCRIPT ANSWERS  Relationship to Patient?  Self

## 2023-09-25 ENCOUNTER — OFFICE VISIT (OUTPATIENT)
Dept: PRIMARY CARE CLINIC | Age: 58
End: 2023-09-25
Payer: COMMERCIAL

## 2023-09-25 VITALS
DIASTOLIC BLOOD PRESSURE: 76 MMHG | HEIGHT: 65 IN | OXYGEN SATURATION: 96 % | HEART RATE: 80 BPM | WEIGHT: 177 LBS | BODY MASS INDEX: 29.49 KG/M2 | SYSTOLIC BLOOD PRESSURE: 112 MMHG

## 2023-09-25 DIAGNOSIS — J40 BRONCHITIS: Primary | ICD-10-CM

## 2023-09-25 PROCEDURE — 99213 OFFICE O/P EST LOW 20 MIN: CPT | Performed by: INTERNAL MEDICINE

## 2023-09-25 RX ORDER — LEVOFLOXACIN 500 MG/1
500 TABLET, FILM COATED ORAL DAILY
Qty: 10 TABLET | Refills: 0 | Status: SHIPPED | OUTPATIENT
Start: 2023-09-25 | End: 2023-10-05

## 2023-09-25 RX ORDER — METHYLPREDNISOLONE 4 MG/1
TABLET ORAL
Qty: 1 KIT | Refills: 0 | Status: SHIPPED | OUTPATIENT
Start: 2023-09-25 | End: 2023-10-01

## 2023-09-25 NOTE — PROGRESS NOTES
cough. Negative for apnea and choking. Cardiovascular:  Negative for chest pain and palpitations. Gastrointestinal:  Negative for abdominal distention and blood in stool. Genitourinary:  Negative for enuresis, hematuria and vaginal bleeding. Musculoskeletal:  Negative for gait problem and joint swelling. Skin:  Negative for rash. Neurological:  Negative for syncope and speech difficulty. Hematological:  Does not bruise/bleed easily. Psychiatric/Behavioral:  Negative for hallucinations and suicidal ideas. Vitals:    09/25/23 1509   BP: 112/76   Pulse: 80   SpO2: 96%   Weight: 177 lb (80.3 kg)   Height: 5' 4.5\" (1.638 m)       Physical Exam  Constitutional:       Appearance: She is well-developed. HENT:      Head: Normocephalic. Eyes:      Conjunctiva/sclera: Conjunctivae normal.   Cardiovascular:      Rate and Rhythm: Normal rate and regular rhythm. Heart sounds: Normal heart sounds. Pulmonary:      Effort: No respiratory distress. Breath sounds: Normal breath sounds. No wheezing. Abdominal:      General: There is no distension. Musculoskeletal:         General: Normal range of motion. Cervical back: Normal range of motion. Neurological:      Mental Status: She is oriented to person, place, and time. Cranial Nerves: No cranial nerve deficit. Psychiatric:         Mood and Affect: Mood normal.         Assessment/Plan  Sofya Higgins was seen today for cough. Diagnoses and all orders for this visit:    Bronchitis  -     XR CHEST (2 VW); Future  -     levoFLOXacin (LEVAQUIN) 500 MG tablet; Take 1 tablet by mouth daily for 10 days  -     methylPREDNISolone (MEDROL DOSEPACK) 4 MG tablet; Take by mouth. No follow-ups on file.     Carlyle Gorman MD

## 2023-09-27 ASSESSMENT — ENCOUNTER SYMPTOMS
PHOTOPHOBIA: 0
BLOOD IN STOOL: 0
ABDOMINAL DISTENTION: 0
APNEA: 0
CHOKING: 0
FACIAL SWELLING: 0
COUGH: 1

## 2023-11-17 ENCOUNTER — OFFICE VISIT (OUTPATIENT)
Dept: PRIMARY CARE CLINIC | Age: 58
End: 2023-11-17

## 2023-11-17 VITALS
HEIGHT: 65 IN | HEART RATE: 88 BPM | OXYGEN SATURATION: 97 % | RESPIRATION RATE: 16 BRPM | SYSTOLIC BLOOD PRESSURE: 120 MMHG | WEIGHT: 177 LBS | DIASTOLIC BLOOD PRESSURE: 62 MMHG | BODY MASS INDEX: 29.49 KG/M2 | TEMPERATURE: 98.4 F

## 2023-11-17 DIAGNOSIS — J02.9 ACUTE PHARYNGITIS, UNSPECIFIED ETIOLOGY: Primary | ICD-10-CM

## 2023-11-17 DIAGNOSIS — R09.81 SINUS CONGESTION: ICD-10-CM

## 2023-11-17 LAB
INFLUENZA A ANTIBODY: NEGATIVE
INFLUENZA B ANTIBODY: NEGATIVE

## 2023-11-17 RX ORDER — METHYLPREDNISOLONE 4 MG/1
TABLET ORAL
Qty: 1 KIT | Refills: 0 | Status: SHIPPED | OUTPATIENT
Start: 2023-11-17

## 2023-11-17 NOTE — PROGRESS NOTES
Subjective:      Patient ID: Latricia Szymanski is a 62 y.o. female    Sore throat x 1 day   HPI  Pt presents with 1 day of sore throat and throat pressure. No cough, no chest pain. No SOB or  wheezing. No fever but had chills. No ear pain. Tested negative for COVID. Assoc sinus congestion and dizziness. Past Medical History:   Diagnosis Date    Anxiety     gummy bears occ. stress at work. Attention deficit disorder (ADD) in adult     dr Ball Host    Thyroid disease      Past Surgical History:   Procedure Laterality Date    BACK SURGERY       Social History     Socioeconomic History    Marital status:      Spouse name: Not on file    Number of children: Not on file    Years of education: Not on file    Highest education level: Not on file   Occupational History    Not on file   Tobacco Use    Smoking status: Never    Smokeless tobacco: Never   Substance and Sexual Activity    Alcohol use: Yes    Drug use: Never    Sexual activity: Not on file   Other Topics Concern    Not on file   Social History Narrative    Not on file     Social Determinants of Health     Financial Resource Strain: Low Risk  (3/30/2023)    Overall Financial Resource Strain (CARDIA)     Difficulty of Paying Living Expenses: Not hard at all   Food Insecurity: No Food Insecurity (3/30/2023)    Hunger Vital Sign     Worried About Running Out of Food in the Last Year: Never true     [address] in the Last Year: Never true   Transportation Needs: Unknown (3/30/2023)    PRAPARE - Transportation     Lack of Transportation (Medical): Not on file     Lack of Transportation (Non-Medical):  No   Physical Activity: Not on file   Stress: Not on file   Social Connections: Not on file   Intimate Partner Violence: Not on file   Housing Stability: Unknown (3/30/2023)    Housing Stability Vital Sign     Unable to Pay for Housing in the Last Year: Not on file     Number of Places Lived in the Last Year: Not on file     Unstable Housing in the Last Year: No

## 2023-11-18 ASSESSMENT — ENCOUNTER SYMPTOMS
SINUS PAIN: 1
COUGH: 0
WHEEZING: 0
ABDOMINAL PAIN: 0
SORE THROAT: 1
SHORTNESS OF BREATH: 0
RHINORRHEA: 0
DIARRHEA: 0
SINUS PRESSURE: 1
NAUSEA: 0
VOMITING: 0

## 2023-11-21 ENCOUNTER — OFFICE VISIT (OUTPATIENT)
Dept: PRIMARY CARE CLINIC | Age: 58
End: 2023-11-21
Payer: COMMERCIAL

## 2023-11-21 VITALS
HEIGHT: 64 IN | OXYGEN SATURATION: 98 % | RESPIRATION RATE: 18 BRPM | WEIGHT: 178 LBS | HEART RATE: 96 BPM | SYSTOLIC BLOOD PRESSURE: 110 MMHG | BODY MASS INDEX: 30.39 KG/M2 | DIASTOLIC BLOOD PRESSURE: 72 MMHG

## 2023-11-21 DIAGNOSIS — Z12.31 SCREENING MAMMOGRAM FOR BREAST CANCER: ICD-10-CM

## 2023-11-21 DIAGNOSIS — H10.31 ACUTE BACTERIAL CONJUNCTIVITIS OF RIGHT EYE: ICD-10-CM

## 2023-11-21 DIAGNOSIS — J20.9 ACUTE BRONCHITIS, UNSPECIFIED ORGANISM: Primary | ICD-10-CM

## 2023-11-21 DIAGNOSIS — F98.8 ATTENTION DEFICIT DISORDER (ADD) IN ADULT: ICD-10-CM

## 2023-11-21 PROCEDURE — 99214 OFFICE O/P EST MOD 30 MIN: CPT | Performed by: INTERNAL MEDICINE

## 2023-11-21 RX ORDER — CODEINE PHOSPHATE/GUAIFENESIN 10-100MG/5
10 LIQUID (ML) ORAL 2 TIMES DAILY PRN
Qty: 118 ML | Refills: 0 | Status: SHIPPED | OUTPATIENT
Start: 2023-11-21 | End: 2023-11-28

## 2023-11-21 RX ORDER — LISDEXAMFETAMINE DIMESYLATE CAPSULES 30 MG/1
30 CAPSULE ORAL EVERY MORNING
Qty: 30 CAPSULE | Refills: 0 | Status: SHIPPED | OUTPATIENT
Start: 2024-01-20 | End: 2024-02-19

## 2023-11-21 RX ORDER — CIPROFLOXACIN HYDROCHLORIDE 3.5 MG/ML
1 SOLUTION/ DROPS TOPICAL
Qty: 5 ML | Refills: 0 | Status: SHIPPED | OUTPATIENT
Start: 2023-11-21 | End: 2023-12-01

## 2023-11-21 RX ORDER — LISDEXAMFETAMINE DIMESYLATE CAPSULES 30 MG/1
30 CAPSULE ORAL EVERY MORNING
Qty: 30 CAPSULE | Refills: 0 | Status: SHIPPED | OUTPATIENT
Start: 2023-11-21 | End: 2023-12-21

## 2023-11-21 RX ORDER — LISDEXAMFETAMINE DIMESYLATE CAPSULES 30 MG/1
30 CAPSULE ORAL EVERY MORNING
Qty: 30 CAPSULE | Refills: 0 | Status: SHIPPED | OUTPATIENT
Start: 2023-12-21 | End: 2024-01-20

## 2023-11-21 RX ORDER — LEVOFLOXACIN 500 MG/1
500 TABLET, FILM COATED ORAL DAILY
Qty: 10 TABLET | Refills: 0 | Status: SHIPPED | OUTPATIENT
Start: 2023-11-21 | End: 2023-12-01

## 2023-11-21 ASSESSMENT — ENCOUNTER SYMPTOMS
SORE THROAT: 1
RHINORRHEA: 1
PHOTOPHOBIA: 0
FACIAL SWELLING: 0
COUGH: 1
APNEA: 0
ABDOMINAL DISTENTION: 0
CHOKING: 0
BLOOD IN STOOL: 0

## 2023-11-21 NOTE — PROGRESS NOTES
Patrice Castro 62 y.o. female presents today with   Chief Complaint   Patient presents with    Medication Check    Conjunctivitis     Right eye. Right eye was stuck. Burning. Started Monday morning. Ear Fullness     Both ears. Started yesterday. Medication Refill    Other     Needs labs. Conjunctivitis   The current episode started 2 days ago. The onset was sudden. The problem has been unchanged. The problem is mild. Associated symptoms include rhinorrhea, sore throat and cough. Pertinent negatives include no photophobia. Ear Fullness   There is pain in both ears. This is a new problem. The current episode started in the past 7 days. The problem occurs every few minutes. The problem has been unchanged. Associated symptoms include coughing, rhinorrhea and a sore throat. Past Medical History:   Diagnosis Date    Anxiety     gummy bears occ. stress at work. Attention deficit disorder (ADD) in adult     dr Ramesh Abdi    Thyroid disease      There are no problems to display for this patient. Past Surgical History:   Procedure Laterality Date    BACK SURGERY       No family history on file. Social History     Socioeconomic History    Marital status:       Spouse name: None    Number of children: None    Years of education: None    Highest education level: None   Tobacco Use    Smoking status: Never    Smokeless tobacco: Never   Substance and Sexual Activity    Alcohol use: Yes    Drug use: Never     Social Determinants of Health     Financial Resource Strain: Low Risk  (3/30/2023)    Overall Financial Resource Strain (CARDIA)     Difficulty of Paying Living Expenses: Not hard at all   Food Insecurity: No Food Insecurity (3/30/2023)    Hunger Vital Sign     Worried About Running Out of Food in the Last Year: Never true     Ran Out of Food in the Last Year: Never true   Transportation Needs: Unknown (3/30/2023)    PRAPARE - Transportation     Lack of Transportation (Non-Medical): No   Housing

## 2023-11-22 ENCOUNTER — OFFICE VISIT (OUTPATIENT)
Dept: PRIMARY CARE CLINIC | Age: 58
End: 2023-11-22
Payer: COMMERCIAL

## 2023-11-22 ENCOUNTER — TELEPHONE (OUTPATIENT)
Dept: PRIMARY CARE CLINIC | Age: 58
End: 2023-11-22

## 2023-11-22 VITALS
RESPIRATION RATE: 18 BRPM | DIASTOLIC BLOOD PRESSURE: 70 MMHG | HEART RATE: 78 BPM | OXYGEN SATURATION: 96 % | SYSTOLIC BLOOD PRESSURE: 130 MMHG | HEIGHT: 64 IN | TEMPERATURE: 98.4 F | WEIGHT: 178 LBS | BODY MASS INDEX: 30.39 KG/M2

## 2023-11-22 DIAGNOSIS — H92.01 OTALGIA OF RIGHT EAR: Primary | ICD-10-CM

## 2023-11-22 PROCEDURE — 99213 OFFICE O/P EST LOW 20 MIN: CPT | Performed by: INTERNAL MEDICINE

## 2023-11-22 ASSESSMENT — ENCOUNTER SYMPTOMS
SORE THROAT: 0
RHINORRHEA: 0
COUGH: 0
WHEEZING: 0
NAUSEA: 0
SHORTNESS OF BREATH: 0
DIARRHEA: 0
VOMITING: 0
ABDOMINAL PAIN: 0
SINUS PRESSURE: 0
SINUS PAIN: 0

## 2023-11-22 NOTE — TELEPHONE ENCOUNTER
Patient came in today crying with ear pain, she seen Dr. Tracey Moncada 11/21/2023 and had a ear irrigation. She had used ear drops prior to her visit to soften up her ear wax. She states Juice Gaitan did her ear irrigation and she advised her that it was hurting while she was irrigating the left side. She said she stated to Juice Gaitan that \"she was in severe pain\" and it was shrugged off and followed up with \" well got to get the ear wax out\" and patient proceed to pull away as she was in pain and then she said again \"that really hurts\" and asked her to \"stop\" and Juice Gaitan said \" well I still have half a bottle of water left\". Juice Gaitan then went to the right to give the left side a break and started irrigating the right side. Then Juice Gaitan went back to the left said and started irrigating again and the patient states she finally had to [employer] away\" said \"you need to stop\" she states Juice Gaitan was not listening to her about it being painful and she told Juice Gaitan \" you have to look, your not looking in my ear to see if there is still wax. \"        The patient would like Dr. Tracey Moncada as well as the  to know this is NOT to get Juice Gaitan in trouble she just wants her to be aware and educated further on ear irrigating and proper techniques to make sure to look in the ears not only at the beginning but in between squirts as well to avoid damage to the ear being done. The patient was apologized too and advised the intent was not to hurt her in anyway, again apologized for her experience.  She was forgiving she again stated \"I do not want to get her in trouble I just want someone to educated her on the proper technique to prevent this from happening again\"

## 2023-11-22 NOTE — PROGRESS NOTES
Subjective:      Patient ID: Vicki Lane is a 62 y.o. female    Ear pain x 12 hours   HPI  Pt presents with 12 hours of left ear pressure pain rated 5/10. Assoc tinnitus, muffled hearing and off-balance sensation. Started after ear irrigation yesterday. No ear discharge, no cough or sinus congestion. Past Medical History:   Diagnosis Date    Anxiety     gummy bears occ. stress at work. Attention deficit disorder (ADD) in adult     dr Vandana Palma    Thyroid disease      Past Surgical History:   Procedure Laterality Date    BACK SURGERY       Social History     Socioeconomic History    Marital status:      Spouse name: Not on file    Number of children: Not on file    Years of education: Not on file    Highest education level: Not on file   Occupational History    Not on file   Tobacco Use    Smoking status: Never    Smokeless tobacco: Never   Substance and Sexual Activity    Alcohol use: Yes    Drug use: Never    Sexual activity: Not on file   Other Topics Concern    Not on file   Social History Narrative    Not on file     Social Determinants of Health     Financial Resource Strain: Low Risk  (3/30/2023)    Overall Financial Resource Strain (CARDIA)     Difficulty of Paying Living Expenses: Not hard at all   Food Insecurity: No Food Insecurity (3/30/2023)    Hunger Vital Sign     Worried About Running Out of Food in the Last Year: Never true     [address] in the Last Year: Never true   Transportation Needs: Unknown (3/30/2023)    PRAPARE - Transportation     Lack of Transportation (Medical): Not on file     Lack of Transportation (Non-Medical):  No   Physical Activity: Not on file   Stress: Not on file   Social Connections: Not on file   Intimate Partner Violence: Not on file   Housing Stability: Unknown (3/30/2023)    Housing Stability Vital Sign     Unable to Pay for Housing in the Last Year: Not on file     Number of Places Lived in the Last Year: Not on file     Unstable Housing in the Last Year: No

## 2024-02-20 ENCOUNTER — OFFICE VISIT (OUTPATIENT)
Dept: PRIMARY CARE CLINIC | Age: 59
End: 2024-02-20
Payer: COMMERCIAL

## 2024-02-20 VITALS
DIASTOLIC BLOOD PRESSURE: 78 MMHG | OXYGEN SATURATION: 98 % | HEART RATE: 76 BPM | SYSTOLIC BLOOD PRESSURE: 120 MMHG | WEIGHT: 188 LBS | BODY MASS INDEX: 31.78 KG/M2

## 2024-02-20 DIAGNOSIS — F98.8 ATTENTION DEFICIT DISORDER (ADD) IN ADULT: ICD-10-CM

## 2024-02-20 DIAGNOSIS — E03.9 HYPOTHYROIDISM, UNSPECIFIED TYPE: ICD-10-CM

## 2024-02-20 DIAGNOSIS — E03.9 HYPOTHYROIDISM, UNSPECIFIED TYPE: Primary | ICD-10-CM

## 2024-02-20 LAB — TSH REFLEX: 2.78 UIU/ML (ref 0.44–3.86)

## 2024-02-20 PROCEDURE — 99213 OFFICE O/P EST LOW 20 MIN: CPT | Performed by: INTERNAL MEDICINE

## 2024-02-20 RX ORDER — LISDEXAMFETAMINE DIMESYLATE CAPSULES 30 MG/1
30 CAPSULE ORAL DAILY
Qty: 30 CAPSULE | Refills: 0 | Status: SHIPPED | OUTPATIENT
Start: 2024-03-21 | End: 2024-04-20

## 2024-02-20 RX ORDER — LISDEXAMFETAMINE DIMESYLATE CAPSULES 30 MG/1
30 CAPSULE ORAL EVERY MORNING
Qty: 30 CAPSULE | Refills: 0 | Status: SHIPPED | OUTPATIENT
Start: 2024-02-20 | End: 2024-03-21

## 2024-02-20 RX ORDER — LISDEXAMFETAMINE DIMESYLATE CAPSULES 30 MG/1
30 CAPSULE ORAL DAILY
Qty: 30 CAPSULE | Refills: 0 | Status: SHIPPED | OUTPATIENT
Start: 2024-04-20 | End: 2024-05-20

## 2024-02-20 ASSESSMENT — PATIENT HEALTH QUESTIONNAIRE - PHQ9
2. FEELING DOWN, DEPRESSED OR HOPELESS: 0
SUM OF ALL RESPONSES TO PHQ QUESTIONS 1-9: 0
1. LITTLE INTEREST OR PLEASURE IN DOING THINGS: 0
SUM OF ALL RESPONSES TO PHQ QUESTIONS 1-9: 0
SUM OF ALL RESPONSES TO PHQ9 QUESTIONS 1 & 2: 0

## 2024-02-20 NOTE — PROGRESS NOTES
Substances Monitoring: Periodic Controlled Substance Monitoring: Possible medication side effects, risk of tolerance/dependence & alternative treatments discussed., Assessed functional status (ability to engage in work or other purposeful activities, the pain intensity and its interference with activities of daily living, quality of family life and social activities, and the physical activity) (Gonzalo Martin MD)    No follow-ups on file.    Gonzalo Martin MD

## 2024-02-27 ENCOUNTER — OFFICE VISIT (OUTPATIENT)
Dept: FAMILY MEDICINE CLINIC | Age: 59
End: 2024-02-27
Payer: COMMERCIAL

## 2024-02-27 VITALS
OXYGEN SATURATION: 95 % | HEIGHT: 64 IN | RESPIRATION RATE: 18 BRPM | SYSTOLIC BLOOD PRESSURE: 120 MMHG | DIASTOLIC BLOOD PRESSURE: 80 MMHG | TEMPERATURE: 98.5 F | WEIGHT: 185.6 LBS | BODY MASS INDEX: 31.69 KG/M2 | HEART RATE: 95 BPM

## 2024-02-27 DIAGNOSIS — J01.90 ACUTE RHINOSINUSITIS: ICD-10-CM

## 2024-02-27 DIAGNOSIS — J20.9 ACUTE BRONCHITIS, UNSPECIFIED ORGANISM: Primary | ICD-10-CM

## 2024-02-27 LAB
INFLUENZA A ANTIBODY: NORMAL
INFLUENZA B ANTIBODY: NORMAL

## 2024-02-27 PROCEDURE — 87804 INFLUENZA ASSAY W/OPTIC: CPT | Performed by: NURSE PRACTITIONER

## 2024-02-27 PROCEDURE — 99213 OFFICE O/P EST LOW 20 MIN: CPT | Performed by: NURSE PRACTITIONER

## 2024-02-27 RX ORDER — AMOXICILLIN 875 MG/1
875 TABLET, COATED ORAL 2 TIMES DAILY
Qty: 20 TABLET | Refills: 0 | Status: SHIPPED | OUTPATIENT
Start: 2024-02-27 | End: 2024-03-08

## 2024-02-27 RX ORDER — ALBUTEROL SULFATE 2.5 MG/3ML
2.5 SOLUTION RESPIRATORY (INHALATION) 4 TIMES DAILY PRN
Qty: 120 EACH | Refills: 3 | Status: SHIPPED | OUTPATIENT
Start: 2024-02-27

## 2024-02-27 RX ORDER — ALBUTEROL SULFATE 90 UG/1
2 AEROSOL, METERED RESPIRATORY (INHALATION) 4 TIMES DAILY PRN
Qty: 18 G | Refills: 0 | Status: SHIPPED | OUTPATIENT
Start: 2024-02-27

## 2024-02-27 ASSESSMENT — ENCOUNTER SYMPTOMS
CHEST TIGHTNESS: 0
SHORTNESS OF BREATH: 0
NAUSEA: 0
WHEEZING: 1
ABDOMINAL PAIN: 0
COUGH: 1
RHINORRHEA: 1
DIARRHEA: 0
SORE THROAT: 0

## 2024-02-27 NOTE — PROGRESS NOTES
Subjective  Ania Preston, 59 y.o. female presents today with:  Chief Complaint   Patient presents with    URI     X4 days with fatigue ,  cough and congestion       HPI  Presents to  for cough and congestion   Symptoms began Thursday   C/o nasal congestion/drainage, feeling lightheaded, body aches & fatigue   Nasal/sinus drainage clear to milky in color, same sputum with cough   Reports audible wheezing   Denies chest tightness or SOB   Denies pleural discomfort   Denies chest pain   Denies N/V/D  Low appetite   Hydrating   Sleep uninterrupted   Has tried allergy med                   Past Medical History:   Diagnosis Date    Anxiety     gummy bears occ. stress at work.    Attention deficit disorder (ADD) in adult     dr Peralta    Thyroid disease       Past Surgical History:   Procedure Laterality Date    BACK SURGERY       No family history on file.          Review of Systems   Constitutional:  Positive for activity change, appetite change and fatigue. Negative for chills and diaphoresis. Fever: felt warm last night.  HENT:  Positive for congestion and rhinorrhea. Negative for ear pain and sore throat.    Respiratory:  Positive for cough and wheezing. Negative for chest tightness and shortness of breath.    Gastrointestinal:  Negative for abdominal pain, diarrhea and nausea.   Musculoskeletal:  Positive for myalgias.   Neurological:  Positive for light-headedness. Negative for headaches.   Psychiatric/Behavioral:  Negative for sleep disturbance.          PMH, Surgical Hx, Family Hx, and Social Hx reviewed and updated.  Health Maintenance reviewed.            Objective  Vitals:    02/27/24 1053   BP: 120/80   Site: Right Upper Arm   Position: Sitting   Cuff Size: Large Adult   Pulse: 95   Resp: 18   Temp: 98.5 °F (36.9 °C)   SpO2: 95%   Weight: 84.2 kg (185 lb 9.6 oz)   Height: 1.626 m (5' 4\")     BP Readings from Last 3 Encounters:   02/27/24 120/80   02/20/24 120/78   11/22/23 130/70     Wt Readings from

## 2024-03-08 ENCOUNTER — OFFICE VISIT (OUTPATIENT)
Dept: PRIMARY CARE CLINIC | Age: 59
End: 2024-03-08
Payer: COMMERCIAL

## 2024-03-08 VITALS
RESPIRATION RATE: 16 BRPM | OXYGEN SATURATION: 96 % | WEIGHT: 187.3 LBS | BODY MASS INDEX: 31.98 KG/M2 | SYSTOLIC BLOOD PRESSURE: 122 MMHG | HEIGHT: 64 IN | DIASTOLIC BLOOD PRESSURE: 80 MMHG | HEART RATE: 88 BPM

## 2024-03-08 DIAGNOSIS — J20.9 ACUTE BRONCHITIS, UNSPECIFIED ORGANISM: Primary | ICD-10-CM

## 2024-03-08 PROCEDURE — 99213 OFFICE O/P EST LOW 20 MIN: CPT | Performed by: INTERNAL MEDICINE

## 2024-03-08 RX ORDER — GUAIFENESIN AND DEXTROMETHORPHAN HYDROBROMIDE 100; 10 MG/5ML; MG/5ML
10 SOLUTION ORAL EVERY 4 HOURS PRN
Qty: 236 ML | Refills: 0 | Status: SHIPPED | OUTPATIENT
Start: 2024-03-08

## 2024-03-08 ASSESSMENT — ENCOUNTER SYMPTOMS
VOMITING: 0
SINUS PRESSURE: 0
RHINORRHEA: 0
SORE THROAT: 0
DIARRHEA: 0
NAUSEA: 0
SINUS PAIN: 0
WHEEZING: 0
SHORTNESS OF BREATH: 0
COUGH: 1
ABDOMINAL PAIN: 0

## 2024-03-08 NOTE — PROGRESS NOTES
Subjective:      Patient ID: Ania Preston is a 59 y.o. female    Cough f/u   HPI  Pt presents for follow up. Placed on high dose amoxicillin for 10 days for URTI. Symptoms resolved except for remnant mild cough with yellow sputum. No fever or chills, no wheezing. Wants additional amoxicillin.    Past Medical History:   Diagnosis Date    Anxiety     gummy bears occ. stress at work.    Attention deficit disorder (ADD) in adult     dr Peralta    Thyroid disease      Past Surgical History:   Procedure Laterality Date    BACK SURGERY       Social History     Socioeconomic History    Marital status:      Spouse name: Not on file    Number of children: Not on file    Years of education: Not on file    Highest education level: Not on file   Occupational History    Not on file   Tobacco Use    Smoking status: Never    Smokeless tobacco: Never   Substance and Sexual Activity    Alcohol use: Yes    Drug use: Never    Sexual activity: Not on file   Other Topics Concern    Not on file   Social History Narrative    Not on file     Social Determinants of Health     Financial Resource Strain: Low Risk  (3/30/2023)    Overall Financial Resource Strain (CARDIA)     Difficulty of Paying Living Expenses: Not hard at all   Food Insecurity: Not on file (3/30/2023)   Transportation Needs: Unknown (3/30/2023)    PRAPARE - Transportation     Lack of Transportation (Medical): Not on file     Lack of Transportation (Non-Medical): No   Physical Activity: Not on file   Stress: Not on file   Social Connections: Not on file   Intimate Partner Violence: Not on file   Housing Stability: Unknown (3/30/2023)    Housing Stability Vital Sign     Unable to Pay for Housing in the Last Year: Not on file     Number of Places Lived in the Last Year: Not on file     Unstable Housing in the Last Year: No     History reviewed. No pertinent family history.  Allergies:  Patient has no known allergies.  There is no problem list on file for this

## 2024-03-25 DIAGNOSIS — J20.9 ACUTE BRONCHITIS, UNSPECIFIED ORGANISM: ICD-10-CM

## 2024-03-29 RX ORDER — ALBUTEROL SULFATE 90 UG/1
2 AEROSOL, METERED RESPIRATORY (INHALATION) 4 TIMES DAILY PRN
Qty: 18 G | Refills: 0 | OUTPATIENT
Start: 2024-03-29

## 2024-04-23 DIAGNOSIS — J20.9 ACUTE BRONCHITIS, UNSPECIFIED ORGANISM: ICD-10-CM

## 2024-04-25 NOTE — TELEPHONE ENCOUNTER
Comments: Please advise    Last Office Visit (last PCP visit):   2/27/2024    Next Visit Date:  Future Appointments   Date Time Provider Department Center   5/21/2024  8:00 AM Gonzalo Martin MD [facility]       **If hasn't been seen in over a year OR hasn't followed up according to last diabetes/ADHD visit, make appointment for patient before sending refill to provider.    Rx requested:  Requested Prescriptions     Pending Prescriptions Disp Refills    albuterol sulfate HFA (PROVENTIL;VENTOLIN;PROAIR) 108 (90 Base) MCG/ACT inhaler [Pharmacy Med Name: albuterol sulfate HFA 90 mcg/actuation aerosol inhaler] 18 g 0     Sig: Inhale 2 puffs into the lungs 4 times daily as needed for Wheezing

## 2024-04-26 RX ORDER — ALBUTEROL SULFATE 90 UG/1
2 AEROSOL, METERED RESPIRATORY (INHALATION) 4 TIMES DAILY PRN
Qty: 18 G | Refills: 0 | Status: SHIPPED | OUTPATIENT
Start: 2024-04-26

## 2024-05-09 ENCOUNTER — OFFICE VISIT (OUTPATIENT)
Dept: PRIMARY CARE CLINIC | Age: 59
End: 2024-05-09
Payer: COMMERCIAL

## 2024-05-09 VITALS
HEART RATE: 89 BPM | BODY MASS INDEX: 31.76 KG/M2 | TEMPERATURE: 98.6 F | DIASTOLIC BLOOD PRESSURE: 74 MMHG | SYSTOLIC BLOOD PRESSURE: 124 MMHG | WEIGHT: 185 LBS | OXYGEN SATURATION: 97 % | RESPIRATION RATE: 18 BRPM

## 2024-05-09 DIAGNOSIS — L03.314 CELLULITIS OF GROIN, LEFT: Primary | ICD-10-CM

## 2024-05-09 DIAGNOSIS — L82.1 SEBORRHEIC KERATOSIS: ICD-10-CM

## 2024-05-09 DIAGNOSIS — L57.0 ACTINIC KERATOSIS: ICD-10-CM

## 2024-05-09 DIAGNOSIS — J20.9 ACUTE BRONCHITIS, UNSPECIFIED ORGANISM: ICD-10-CM

## 2024-05-09 PROCEDURE — 99214 OFFICE O/P EST MOD 30 MIN: CPT | Performed by: INTERNAL MEDICINE

## 2024-05-09 RX ORDER — CODEINE PHOSPHATE/GUAIFENESIN 10-100MG/5
10 LIQUID (ML) ORAL 2 TIMES DAILY PRN
Qty: 118 ML | Refills: 0 | Status: SHIPPED | OUTPATIENT
Start: 2024-05-09 | End: 2024-05-16

## 2024-05-09 RX ORDER — SULFAMETHOXAZOLE AND TRIMETHOPRIM 800; 160 MG/1; MG/1
1 TABLET ORAL 2 TIMES DAILY
Qty: 20 TABLET | Refills: 0 | Status: SHIPPED | OUTPATIENT
Start: 2024-05-09 | End: 2024-05-19

## 2024-05-09 SDOH — ECONOMIC STABILITY: FOOD INSECURITY: WITHIN THE PAST 12 MONTHS, THE FOOD YOU BOUGHT JUST DIDN'T LAST AND YOU DIDN'T HAVE MONEY TO GET MORE.: NEVER TRUE

## 2024-05-09 SDOH — ECONOMIC STABILITY: INCOME INSECURITY: HOW HARD IS IT FOR YOU TO PAY FOR THE VERY BASICS LIKE FOOD, HOUSING, MEDICAL CARE, AND HEATING?: NOT HARD AT ALL

## 2024-05-09 SDOH — ECONOMIC STABILITY: FOOD INSECURITY: WITHIN THE PAST 12 MONTHS, YOU WORRIED THAT YOUR FOOD WOULD RUN OUT BEFORE YOU GOT MONEY TO BUY MORE.: NEVER TRUE

## 2024-05-09 NOTE — PROGRESS NOTES
Subjective:      Patient ID: Ania Preston is a 59 y.o. female    Groin lump x 1 day  Cough x 3 days   HPI  Pt presents with 1 day of painful lump in the left groin. Initially nonpainful since 1 month ago but became red and painful 4 days ago. Attests to pubic area shaving.     Cough x 3 days. Cough is productive of yellow sputum relieved with albuterol inhaler. No chest pain but attests to chest congestion. No SOB, but attests to wheezing, no fever or chills.       Left forehead rash noticed a few days ago. Assoc flaking, no itch or pain. Attests to diligent use of sunscreen.   Past Medical History:   Diagnosis Date    Anxiety     gummy bears occ. stress at work.    Attention deficit disorder (ADD) in adult     dr Peralta    Thyroid disease      Past Surgical History:   Procedure Laterality Date    BACK SURGERY       Social History     Socioeconomic History    Marital status:      Spouse name: Not on file    Number of children: Not on file    Years of education: Not on file    Highest education level: Not on file   Occupational History    Not on file   Tobacco Use    Smoking status: Never    Smokeless tobacco: Never   Substance and Sexual Activity    Alcohol use: Yes    Drug use: Never    Sexual activity: Not on file   Other Topics Concern    Not on file   Social History Narrative    Not on file     Social Determinants of Health     Financial Resource Strain: Low Risk  (5/9/2024)    Overall Financial Resource Strain (CARDIA)     Difficulty of Paying Living Expenses: Not hard at all   Food Insecurity: No Food Insecurity (5/9/2024)    Hunger Vital Sign     Worried About Running Out of Food in the Last Year: Never true     Ran Out of Food in the Last Year: Never true   Transportation Needs: Unknown (5/9/2024)    PRAPARE - Transportation     Lack of Transportation (Medical): Not on file     Lack of Transportation (Non-Medical): No   Physical Activity: Not on file   Stress: Not on file   Social Connections: Not on

## 2024-05-10 ASSESSMENT — ENCOUNTER SYMPTOMS
COLOR CHANGE: 1
DIARRHEA: 0
SINUS PRESSURE: 0
SHORTNESS OF BREATH: 0
NAUSEA: 0
WHEEZING: 0
RHINORRHEA: 0
ABDOMINAL PAIN: 0
VOMITING: 0
SORE THROAT: 0
COUGH: 1
SINUS PAIN: 0

## 2024-05-21 ENCOUNTER — OFFICE VISIT (OUTPATIENT)
Dept: PRIMARY CARE CLINIC | Age: 59
End: 2024-05-21
Payer: COMMERCIAL

## 2024-05-21 VITALS
DIASTOLIC BLOOD PRESSURE: 80 MMHG | SYSTOLIC BLOOD PRESSURE: 124 MMHG | HEART RATE: 80 BPM | BODY MASS INDEX: 31.41 KG/M2 | OXYGEN SATURATION: 98 % | WEIGHT: 183 LBS

## 2024-05-21 DIAGNOSIS — L08.9 FINGER, SUPERFICIAL FOREIGN BODY (SPLINTER), INFECTED, INITIAL ENCOUNTER: ICD-10-CM

## 2024-05-21 DIAGNOSIS — Z51.81 THERAPEUTIC DRUG MONITORING: ICD-10-CM

## 2024-05-21 DIAGNOSIS — S60.459A FINGER, SUPERFICIAL FOREIGN BODY (SPLINTER), INFECTED, INITIAL ENCOUNTER: ICD-10-CM

## 2024-05-21 DIAGNOSIS — F98.8 ATTENTION DEFICIT DISORDER (ADD) IN ADULT: Primary | ICD-10-CM

## 2024-05-21 PROCEDURE — 99214 OFFICE O/P EST MOD 30 MIN: CPT | Performed by: INTERNAL MEDICINE

## 2024-05-21 RX ORDER — LISDEXAMFETAMINE DIMESYLATE 30 MG/1
30 CAPSULE ORAL DAILY
Qty: 30 CAPSULE | Refills: 0 | Status: SHIPPED | OUTPATIENT
Start: 2024-05-21 | End: 2024-06-20

## 2024-05-21 RX ORDER — LISDEXAMFETAMINE DIMESYLATE 30 MG/1
30 CAPSULE ORAL DAILY
Qty: 30 CAPSULE | Refills: 0 | Status: SHIPPED | OUTPATIENT
Start: 2024-06-20 | End: 2024-07-20

## 2024-05-21 RX ORDER — AMOXICILLIN AND CLAVULANATE POTASSIUM 875; 125 MG/1; MG/1
1 TABLET, FILM COATED ORAL 2 TIMES DAILY
Qty: 14 TABLET | Refills: 0 | Status: SHIPPED | OUTPATIENT
Start: 2024-05-21 | End: 2024-05-28

## 2024-05-21 RX ORDER — LISDEXAMFETAMINE DIMESYLATE 30 MG/1
30 CAPSULE ORAL DAILY
Qty: 30 CAPSULE | Refills: 0 | Status: SHIPPED | OUTPATIENT
Start: 2024-07-20 | End: 2024-08-19

## 2024-05-21 NOTE — PROGRESS NOTES
Ania Preston 59 y.o. female presents today with   Chief Complaint   Patient presents with    3 Month Follow-Up    ADHD    Medication Refill    Finger Injury     Red and swollen       ADHD  This is a chronic problem. The current episode started more than 1 year ago. The problem occurs daily. The problem has been waxing and waning. Pertinent negatives include no abdominal pain, chest pain, chills, congestion, fever, joint swelling or rash.       Past Medical History:   Diagnosis Date    Anxiety     gummy bears occ. stress at work.    Attention deficit disorder (ADD) in adult     dr Peralta    Thyroid disease      There are no problems to display for this patient.    Past Surgical History:   Procedure Laterality Date    BACK SURGERY       No family history on file.  Social History     Socioeconomic History    Marital status:      Spouse name: None    Number of children: None    Years of education: None    Highest education level: None   Tobacco Use    Smoking status: Never    Smokeless tobacco: Never   Substance and Sexual Activity    Alcohol use: Yes    Drug use: Never     Social Determinants of Health     Financial Resource Strain: Low Risk  (5/9/2024)    Overall Financial Resource Strain (CARDIA)     Difficulty of Paying Living Expenses: Not hard at all   Food Insecurity: No Food Insecurity (5/9/2024)    Hunger Vital Sign     Worried About Running Out of Food in the Last Year: Never true     Ran Out of Food in the Last Year: Never true   Transportation Needs: Unknown (5/9/2024)    PRAPARE - Transportation     Lack of Transportation (Non-Medical): No   Housing Stability: Unknown (5/9/2024)    Housing Stability Vital Sign     Unstable Housing in the Last Year: No     No Known Allergies    Review of Systems   Constitutional:  Negative for chills and fever.   HENT:  Negative for congestion, facial swelling and nosebleeds.    Eyes:  Negative for photophobia and visual disturbance.   Respiratory:  Negative for

## 2024-05-23 LAB — BACTERIA SPEC ANAEROBE+AEROBE CULT: NORMAL

## 2024-05-24 LAB
6MAM UR QL: NOT DETECTED
7-AMINOCLONAZEPAM: NOT DETECTED
ALPHA-OH-ALPRAZOLAM: NOT DETECTED
ALPHA-OH-MIDAZOLAM, URINE: NOT DETECTED
ALPRAZOLAM: NOT DETECTED
AMPHET UR QL SCN: PRESENT
BARBITURATES: NEGATIVE
BENZOYLECGONINE: NEGATIVE
BUPRENORPHINE: NOT DETECTED
CARISOPRODOL UR QL: NEGATIVE
CLONAZEPAM UR QL: NOT DETECTED
CODEINE: NOT DETECTED
CREAT UR-MCNC: 269.3 MG/DL (ref 20–400)
DIAZEPAM: NOT DETECTED
ETHYL GLUCURONIDE: NEGATIVE
FENTANYL UR QL: NOT DETECTED
GABAPENTIN: NOT DETECTED
HYDROCODONE UR QL: NOT DETECTED
HYDROMORPHONE: NOT DETECTED
LORAZEPAM UR QL: NOT DETECTED
MARIJUANA METABOLITE: NORMAL
MDA: NOT DETECTED
MDEA: NOT DETECTED
MDMA UR QL: NOT DETECTED
MEPERIDINE: NOT DETECTED
METHADONE: NEGATIVE
METHAMPHETAMINE: NOT DETECTED
METHYLPHENIDATE: NOT DETECTED
MIDAZOLAM UR QL SCN: NOT DETECTED
MORPHINE: NOT DETECTED
NALOXONE: NOT DETECTED
NORBUPRENORPHINE, FREE: NOT DETECTED
NORDIAZEPAM: NOT DETECTED
NORFENTANYL: NOT DETECTED
NORHYDROCODONE, URINE: NOT DETECTED
NOROXYCODONE: NOT DETECTED
NOROXYMORPHONE, URINE: NOT DETECTED
OXAZEPAM UR QL: NOT DETECTED
OXYCODONE UR QL: NOT DETECTED
OXYMORPHONE UR QL: NOT DETECTED
PAIN MANAGEMENT DRUG PANEL: NORMAL
PATHOLOGY STUDY: NORMAL
PCP: NEGATIVE
PHENTERMINE: NOT DETECTED
PREGABALIN: NOT DETECTED
TAPENTADOL, URINE: NOT DETECTED
TAPENTADOL-O-SULFATE, URINE: NOT DETECTED
TEMAZEPAM: NOT DETECTED
TRAMADOL: NEGATIVE
ZOLPIDEM: NOT DETECTED

## 2024-05-29 ENCOUNTER — HOSPITAL ENCOUNTER (EMERGENCY)
Facility: HOSPITAL | Age: 59
Discharge: HOME | End: 2024-05-29
Attending: EMERGENCY MEDICINE
Payer: COMMERCIAL

## 2024-05-29 ENCOUNTER — APPOINTMENT (OUTPATIENT)
Dept: RADIOLOGY | Facility: HOSPITAL | Age: 59
End: 2024-05-29
Payer: COMMERCIAL

## 2024-05-29 VITALS
HEIGHT: 64 IN | HEART RATE: 86 BPM | BODY MASS INDEX: 30.73 KG/M2 | SYSTOLIC BLOOD PRESSURE: 142 MMHG | OXYGEN SATURATION: 99 % | DIASTOLIC BLOOD PRESSURE: 88 MMHG | WEIGHT: 180 LBS | TEMPERATURE: 97 F | RESPIRATION RATE: 18 BRPM

## 2024-05-29 DIAGNOSIS — L03.019 FELON OF FINGER: Primary | ICD-10-CM

## 2024-05-29 PROCEDURE — 26010 DRAINAGE OF FINGER ABSCESS: CPT | Mod: F6

## 2024-05-29 PROCEDURE — 2500000005 HC RX 250 GENERAL PHARMACY W/O HCPCS

## 2024-05-29 PROCEDURE — 99283 EMERGENCY DEPT VISIT LOW MDM: CPT | Mod: 25

## 2024-05-29 PROCEDURE — 73140 X-RAY EXAM OF FINGER(S): CPT | Mod: RT

## 2024-05-29 PROCEDURE — 73140 X-RAY EXAM OF FINGER(S): CPT | Mod: RIGHT SIDE | Performed by: RADIOLOGY

## 2024-05-29 RX ORDER — AMOXICILLIN AND CLAVULANATE POTASSIUM 875; 125 MG/1; MG/1
1 TABLET, FILM COATED ORAL 2 TIMES DAILY
Qty: 14 TABLET | Refills: 0 | Status: SHIPPED | OUTPATIENT
Start: 2024-05-29 | End: 2024-06-05

## 2024-05-29 RX ORDER — LIDOCAINE HYDROCHLORIDE 20 MG/ML
20 INJECTION, SOLUTION EPIDURAL; INFILTRATION; INTRACAUDAL; PERINEURAL ONCE
Status: COMPLETED | OUTPATIENT
Start: 2024-05-29 | End: 2024-05-29

## 2024-05-29 RX ORDER — LIDOCAINE HYDROCHLORIDE 10 MG/ML
INJECTION, SOLUTION EPIDURAL; INFILTRATION; INTRACAUDAL; PERINEURAL
Status: DISCONTINUED
Start: 2024-05-29 | End: 2024-05-29 | Stop reason: HOSPADM

## 2024-05-29 RX ORDER — KETOROLAC TROMETHAMINE 30 MG/ML
30 INJECTION, SOLUTION INTRAMUSCULAR; INTRAVENOUS ONCE
Status: DISCONTINUED | OUTPATIENT
Start: 2024-05-29 | End: 2024-05-29

## 2024-05-29 RX ADMIN — LIDOCAINE HYDROCHLORIDE 100 MG: 20 INJECTION, SOLUTION EPIDURAL; INFILTRATION; INTRACAUDAL; PERINEURAL at 09:47

## 2024-05-29 ASSESSMENT — PAIN DESCRIPTION - PROGRESSION: CLINICAL_PROGRESSION: RAPIDLY IMPROVING

## 2024-05-29 ASSESSMENT — COLUMBIA-SUICIDE SEVERITY RATING SCALE - C-SSRS
1. IN THE PAST MONTH, HAVE YOU WISHED YOU WERE DEAD OR WISHED YOU COULD GO TO SLEEP AND NOT WAKE UP?: NO
6. HAVE YOU EVER DONE ANYTHING, STARTED TO DO ANYTHING, OR PREPARED TO DO ANYTHING TO END YOUR LIFE?: NO
2. HAVE YOU ACTUALLY HAD ANY THOUGHTS OF KILLING YOURSELF?: NO

## 2024-05-29 ASSESSMENT — LIFESTYLE VARIABLES
HAVE YOU EVER FELT YOU SHOULD CUT DOWN ON YOUR DRINKING: NO
EVER FELT BAD OR GUILTY ABOUT YOUR DRINKING: NO
EVER HAD A DRINK FIRST THING IN THE MORNING TO STEADY YOUR NERVES TO GET RID OF A HANGOVER: NO
HAVE PEOPLE ANNOYED YOU BY CRITICIZING YOUR DRINKING: NO
TOTAL SCORE: 0

## 2024-05-29 ASSESSMENT — PAIN - FUNCTIONAL ASSESSMENT
PAIN_FUNCTIONAL_ASSESSMENT: 0-10
PAIN_FUNCTIONAL_ASSESSMENT: 0-10

## 2024-05-29 ASSESSMENT — PAIN SCALES - GENERAL
PAINLEVEL_OUTOF10: 5 - MODERATE PAIN
PAINLEVEL_OUTOF10: 2

## 2024-05-29 ASSESSMENT — PAIN DESCRIPTION - PAIN TYPE
TYPE: ACUTE PAIN
TYPE: ACUTE PAIN

## 2024-05-29 ASSESSMENT — PAIN DESCRIPTION - LOCATION: LOCATION: FINGER (COMMENT WHICH ONE)

## 2024-05-29 ASSESSMENT — PAIN DESCRIPTION - ORIENTATION: ORIENTATION: RIGHT

## 2024-05-29 ASSESSMENT — PAIN DESCRIPTION - FREQUENCY: FREQUENCY: CONSTANT/CONTINUOUS

## 2024-05-29 NOTE — ED PROVIDER NOTES
HPI   Chief Complaint   Patient presents with    Foreign Body in Skin     Right second finger from bench in yard since Sunday. Seen by pcp and prescribed abx, pt states swelling is getting worse since finishing tx       Patient is a 59-year-old female presenting to [facility] ED for right index finger swelling, possible infection.  Patient had splinter injury last week and was seen recently by PCP.  Patient reports that most of splinter was probably removed.  Patient was placed on 7-day course of Augmentin antibiotics by PCP.  Patient just completed course yesterday.  Patient reports increasing swelling, pus drainage, increased warmth and redness.  Patient denies any recent fever, chills, numbness/tingling, or weakness.  Remaining review of systems, otherwise unremarkable.                          [place] Coma Scale Score: 15                     Patient History   No past medical history on file.  No past surgical history on file.  No family history on file.  Social History     Tobacco Use    Smoking status: Not on file    Smokeless tobacco: Not on file   Substance Use Topics    Alcohol use: Not on file    Drug use: Not on file       Physical Exam   ED Triage Vitals [05/29/24 0856]   Temperature Heart Rate Respirations BP   36.1 °C (97 °F) 88 18 (!) 152/105      Pulse Ox Temp Source Heart Rate Source Patient Position   100 % Tympanic Monitor Sitting      BP Location FiO2 (%)     Right arm --       Physical Exam  Constitutional:       Appearance: Normal appearance. She is normal weight.   HENT:      Head: Normocephalic and atraumatic.      Nose: Nose normal.      Mouth/Throat:      Mouth: Mucous membranes are moist.      Pharynx: Oropharynx is clear.   Eyes:      Extraocular Movements: Extraocular movements intact.      Conjunctiva/sclera: Conjunctivae normal.      Pupils: Pupils are equal, round, and reactive to light.   Cardiovascular:      Rate and Rhythm: Normal rate and regular rhythm.      Pulses: Normal  pulses.      Heart sounds: Normal heart sounds.   Pulmonary:      Effort: Pulmonary effort is normal.      Breath sounds: Normal breath sounds.   Abdominal:      General: Abdomen is flat. Bowel sounds are normal.      Palpations: Abdomen is soft.   Musculoskeletal:         General: Swelling present. Normal range of motion.      Cervical back: Normal range of motion and neck supple.      Comments: Increased warmth/erythema of the right second digit distal to the DIP, more medial.   Skin:     General: Skin is warm and dry.      Capillary Refill: Capillary refill takes less than 2 seconds.   Neurological:      General: No focal deficit present.      Mental Status: She is alert and oriented to person, place, and time. Mental status is at baseline.   Psychiatric:         Mood and Affect: Mood normal.         Behavior: Behavior normal.         ED Course & MDM   Diagnoses as of 05/29/24 1140   Felon of finger       Medical Decision Making  Patient is a 59 y.o. female who presents to [facility] ED for Foreign Body in Skin (Right second finger from bench in yard since Sunday. Seen by pcp and prescribed abx, pt states swelling is getting worse since finishing tx). On initial ED evaluation, patient found to be in no acute distress. Per HPI, concern to evaluate and treat for felon of the right second digit.  X-ray imaging of the right hand was obtained.  X-ray imaging negative for any acute pathology or other osseous abnormality.  Felon of right second digit was incised and drained through and through the pulp distal to the DIP.  Local anesthesia obtained through digital block and local injection with 2% lidocaine without epinephrine.  Procedure tolerated well.  Pus/blood drained from felon.  Patient to be discharged on 7-day course of Augmentin.  Diagnostic findings and treatment discussed with patient.  Patient amenable to plan.      Rx given for Augmentin. Patient to follow up with PCP outpatient, wound care referral provided.  Anticipatory guidance and return precautions provided.  Patient otherwise stable for discharge.          Procedure  Procedures     Michael Gallego MD  Resident  05/29/24 1144

## 2024-05-29 NOTE — DISCHARGE INSTRUCTIONS
Please return close ED if you develop any worsening symptoms including fever, chills, excessive blood/drainage from the finger, loss of function, or numbness/tingling.

## 2024-05-30 ENCOUNTER — OFFICE VISIT (OUTPATIENT)
Dept: WOUND CARE | Facility: CLINIC | Age: 59
End: 2024-05-30
Payer: COMMERCIAL

## 2024-05-30 PROCEDURE — 99213 OFFICE O/P EST LOW 20 MIN: CPT

## 2024-05-31 ENCOUNTER — TELEPHONE (OUTPATIENT)
Dept: PRIMARY CARE CLINIC | Age: 59
End: 2024-05-31

## 2024-05-31 DIAGNOSIS — N76.0 ACUTE VAGINITIS: Primary | ICD-10-CM

## 2024-05-31 RX ORDER — FLUCONAZOLE 150 MG/1
150 TABLET ORAL
Qty: 2 TABLET | Refills: 1 | Status: SHIPPED | OUTPATIENT
Start: 2024-05-31 | End: 2024-06-12

## 2024-05-31 NOTE — TELEPHONE ENCOUNTER
Pt went to er to have foreign object removed from finger and they couldn't get it so she is going to see a hand specialist, but they placed her on another antibiotic, and she is afraid she is getting a yeast infection.  Can you send in Diflucan

## 2024-06-03 ENCOUNTER — OFFICE VISIT (OUTPATIENT)
Dept: WOUND CARE | Facility: CLINIC | Age: 59
End: 2024-06-03
Payer: COMMERCIAL

## 2024-06-03 PROCEDURE — 99212 OFFICE O/P EST SF 10 MIN: CPT

## 2024-06-17 DIAGNOSIS — J20.9 ACUTE BRONCHITIS, UNSPECIFIED ORGANISM: ICD-10-CM

## 2024-06-17 RX ORDER — ALBUTEROL SULFATE 90 UG/1
2 AEROSOL, METERED RESPIRATORY (INHALATION) 4 TIMES DAILY PRN
Qty: 18 G | Refills: 1 | Status: SHIPPED | OUTPATIENT
Start: 2024-06-17

## 2024-06-17 NOTE — TELEPHONE ENCOUNTER
Comments: Last office visit 5/21/24    Last Office Visit (last PCP visit):   Visit date not found    Next Visit Date:  Future Appointments   Date Time Provider Department Center   8/21/2024  8:15 AM Gonzalo Martin MD [facility]       **If hasn't been seen in over a year OR hasn't followed up according to last diabetes/ADHD visit, make appointment for patient before sending refill to provider.    Rx requested:  Requested Prescriptions     Pending Prescriptions Disp Refills    albuterol sulfate HFA (PROVENTIL;VENTOLIN;PROAIR) 108 (90 Base) MCG/ACT inhaler [Pharmacy Med Name: albuterol sulfate HFA 90 mcg/actuation aerosol inhaler] 18 g 0     Sig: Inhale 2 puffs into the lungs 4 times daily as needed for Wheezing

## 2024-08-04 DIAGNOSIS — E03.9 HYPOTHYROIDISM, UNSPECIFIED TYPE: ICD-10-CM

## 2024-08-05 RX ORDER — LEVOTHYROXINE SODIUM 112 UG/1
120 TABLET ORAL DAILY
Qty: 30 TABLET | Refills: 11 | Status: SHIPPED | OUTPATIENT
Start: 2024-08-05

## 2024-08-17 DIAGNOSIS — F98.8 ATTENTION DEFICIT DISORDER (ADD) IN ADULT: ICD-10-CM

## 2024-08-17 RX ORDER — LISDEXAMFETAMINE DIMESYLATE 30 MG/1
30 CAPSULE ORAL DAILY
Qty: 30 CAPSULE | Refills: 0 | Status: SHIPPED | OUTPATIENT
Start: 2024-08-17 | End: 2024-09-16

## 2024-08-21 ENCOUNTER — OFFICE VISIT (OUTPATIENT)
Dept: PRIMARY CARE CLINIC | Age: 59
End: 2024-08-21
Payer: COMMERCIAL

## 2024-08-21 VITALS
BODY MASS INDEX: 31.76 KG/M2 | WEIGHT: 186 LBS | OXYGEN SATURATION: 98 % | DIASTOLIC BLOOD PRESSURE: 78 MMHG | HEIGHT: 64 IN | HEART RATE: 82 BPM | SYSTOLIC BLOOD PRESSURE: 112 MMHG

## 2024-08-21 DIAGNOSIS — R73.9 HYPERGLYCEMIA: ICD-10-CM

## 2024-08-21 DIAGNOSIS — E78.2 MIXED HYPERLIPIDEMIA: ICD-10-CM

## 2024-08-21 DIAGNOSIS — Z00.00 PREVENTATIVE HEALTH CARE: Primary | ICD-10-CM

## 2024-08-21 DIAGNOSIS — E03.9 HYPOTHYROIDISM, UNSPECIFIED TYPE: ICD-10-CM

## 2024-08-21 DIAGNOSIS — F98.8 ATTENTION DEFICIT DISORDER (ADD) IN ADULT: ICD-10-CM

## 2024-08-21 PROCEDURE — 99396 PREV VISIT EST AGE 40-64: CPT | Performed by: INTERNAL MEDICINE

## 2024-08-21 RX ORDER — LISDEXAMFETAMINE DIMESYLATE 30 MG/1
30 CAPSULE ORAL DAILY
Qty: 30 CAPSULE | Refills: 0 | Status: SHIPPED | OUTPATIENT
Start: 2024-10-20 | End: 2024-11-19

## 2024-08-21 RX ORDER — LISDEXAMFETAMINE DIMESYLATE 30 MG/1
30 CAPSULE ORAL DAILY
Qty: 30 CAPSULE | Refills: 0 | Status: SHIPPED | OUTPATIENT
Start: 2024-09-20 | End: 2024-10-20

## 2024-08-21 RX ORDER — LISDEXAMFETAMINE DIMESYLATE 30 MG/1
30 CAPSULE ORAL DAILY
Qty: 30 CAPSULE | Refills: 0 | Status: SHIPPED | OUTPATIENT
Start: 2024-08-21 | End: 2024-09-20

## 2024-08-21 NOTE — PROGRESS NOTES
Ania Preston 59 y.o. female presents today with   Chief Complaint   Patient presents with    3 Month Follow-Up    ADHD    Medication Refill     annual  ADHD  This is a chronic problem. The current episode started more than 1 year ago. The problem occurs daily. The problem has been waxing and waning. Pertinent negatives include no anorexia, chest pain, chills, fever, joint swelling or rash.       Past Medical History:   Diagnosis Date    Anxiety     gummy bears occ. stress at work.    Attention deficit disorder (ADD) in adult     dr Peralta    Thyroid disease      There are no problems to display for this patient.    Past Surgical History:   Procedure Laterality Date    BACK SURGERY       No family history on file.  Social History     Socioeconomic History    Marital status:      Spouse name: None    Number of children: None    Years of education: None    Highest education level: None   Tobacco Use    Smoking status: Never    Smokeless tobacco: Never   Substance and Sexual Activity    Alcohol use: Yes    Drug use: Never     Social Determinants of Health     Financial Resource Strain: Low Risk  (5/9/2024)    Overall Financial Resource Strain (CARDIA)     Difficulty of Paying Living Expenses: Not hard at all   Food Insecurity: No Food Insecurity (5/9/2024)    Hunger Vital Sign     Worried About Running Out of Food in the Last Year: Never true     Ran Out of Food in the Last Year: Never true   Transportation Needs: Unknown (5/9/2024)    PRAPARE - Transportation     Lack of Transportation (Non-Medical): No   Housing Stability: Unknown (5/9/2024)    Housing Stability Vital Sign     Unstable Housing in the Last Year: No     No Known Allergies    Review of Systems   Constitutional:  Negative for chills and fever.   HENT:  Negative for facial swelling and nosebleeds.    Eyes:  Negative for photophobia and visual disturbance.   Respiratory:  Negative for apnea and choking.    Cardiovascular:  Negative for chest pain  Daily Amount: 30 mg  -     lisdexamfetamine (VYVANSE) 30 MG capsule; Take 1 capsule by mouth daily for 30 days. Max Daily Amount: 30 mg    Hypothyroidism, unspecified type  -     TSH with Reflex; Future    Hyperglycemia  -     Hemoglobin A1C; Future    Mixed hyperlipidemia  -     Comprehensive Metabolic Panel; Future  -     Lipid Panel; Future        No follow-ups on file.    Gonzalo Martin MD

## 2024-08-27 ASSESSMENT — ENCOUNTER SYMPTOMS
CHOKING: 0
FACIAL SWELLING: 0
APNEA: 0
ABDOMINAL DISTENTION: 0
BLOOD IN STOOL: 0
PHOTOPHOBIA: 0

## 2024-09-16 ENCOUNTER — TELEPHONE (OUTPATIENT)
Dept: PRIMARY CARE CLINIC | Age: 59
End: 2024-09-16

## 2024-11-20 ENCOUNTER — OFFICE VISIT (OUTPATIENT)
Dept: PRIMARY CARE CLINIC | Age: 59
End: 2024-11-20
Payer: COMMERCIAL

## 2024-11-20 VITALS
HEART RATE: 88 BPM | HEIGHT: 64 IN | WEIGHT: 187 LBS | BODY MASS INDEX: 31.92 KG/M2 | SYSTOLIC BLOOD PRESSURE: 132 MMHG | DIASTOLIC BLOOD PRESSURE: 74 MMHG | OXYGEN SATURATION: 98 %

## 2024-11-20 DIAGNOSIS — F98.8 ATTENTION DEFICIT DISORDER (ADD) IN ADULT: ICD-10-CM

## 2024-11-20 PROCEDURE — 99213 OFFICE O/P EST LOW 20 MIN: CPT | Performed by: INTERNAL MEDICINE

## 2024-11-20 RX ORDER — LISDEXAMFETAMINE DIMESYLATE 30 MG/1
30 CAPSULE ORAL DAILY
Qty: 30 CAPSULE | Refills: 0 | Status: SHIPPED | OUTPATIENT
Start: 2024-12-20 | End: 2025-01-19

## 2024-11-20 RX ORDER — LISDEXAMFETAMINE DIMESYLATE 30 MG/1
30 CAPSULE ORAL DAILY
Qty: 30 CAPSULE | Refills: 0 | Status: SHIPPED | OUTPATIENT
Start: 2025-01-19 | End: 2025-02-18

## 2024-11-20 RX ORDER — LISDEXAMFETAMINE DIMESYLATE 30 MG/1
30 CAPSULE ORAL DAILY
Qty: 30 CAPSULE | Refills: 0 | Status: SHIPPED | OUTPATIENT
Start: 2024-11-20 | End: 2024-12-20

## 2024-11-20 NOTE — PROGRESS NOTES
Ania Preston 59 y.o. female presents today with   Chief Complaint   Patient presents with    3 Month Follow-Up    ADHD    Medication Refill       ADHD  This is a chronic problem. The current episode started more than 1 year ago. The problem occurs daily. The problem has been waxing and waning.       Past Medical History:   Diagnosis Date    Anxiety     gummy bears occ. stress at work.    Attention deficit disorder (ADD) in adult     dr Peralta    Thyroid disease      There are no problems to display for this patient.    Past Surgical History:   Procedure Laterality Date    BACK SURGERY       No family history on file.  Social History     Socioeconomic History    Marital status:      Spouse name: None    Number of children: None    Years of education: None    Highest education level: None   Tobacco Use    Smoking status: Never    Smokeless tobacco: Never   Substance and Sexual Activity    Alcohol use: Yes    Drug use: Never     Social Determinants of Health     Financial Resource Strain: Low Risk  (5/9/2024)    Overall Financial Resource Strain (CARDIA)     Difficulty of Paying Living Expenses: Not hard at all   Food Insecurity: No Food Insecurity (5/9/2024)    Hunger Vital Sign     Worried About Running Out of Food in the Last Year: Never true     Ran Out of Food in the Last Year: Never true   Transportation Needs: Unknown (5/9/2024)    PRAPARE - Transportation     Lack of Transportation (Non-Medical): No   Housing Stability: Unknown (5/9/2024)    Housing Stability Vital Sign     Unstable Housing in the Last Year: No     No Known Allergies    Review of Systems   HENT:  Negative for facial swelling and nosebleeds.    Eyes:  Negative for photophobia and visual disturbance.   Respiratory:  Negative for apnea and choking.    Cardiovascular:  Negative for palpitations.   Gastrointestinal:  Negative for abdominal distention and blood in stool.   Genitourinary:  Negative for enuresis, hematuria and vaginal bleeding.

## 2025-02-17 ENCOUNTER — OFFICE VISIT (OUTPATIENT)
Dept: PRIMARY CARE CLINIC | Age: 60
End: 2025-02-17

## 2025-02-17 VITALS
HEART RATE: 65 BPM | BODY MASS INDEX: 31.76 KG/M2 | WEIGHT: 186 LBS | DIASTOLIC BLOOD PRESSURE: 76 MMHG | OXYGEN SATURATION: 98 % | HEIGHT: 64 IN | SYSTOLIC BLOOD PRESSURE: 110 MMHG

## 2025-02-17 DIAGNOSIS — F98.8 ATTENTION DEFICIT DISORDER (ADD) IN ADULT: Primary | ICD-10-CM

## 2025-02-17 DIAGNOSIS — Z51.81 THERAPEUTIC DRUG MONITORING: ICD-10-CM

## 2025-02-17 DIAGNOSIS — E03.9 HYPOTHYROIDISM, UNSPECIFIED TYPE: ICD-10-CM

## 2025-02-17 RX ORDER — LISDEXAMFETAMINE DIMESYLATE 30 MG/1
30 CAPSULE ORAL DAILY
Qty: 30 CAPSULE | Refills: 0 | Status: SHIPPED | OUTPATIENT
Start: 2025-04-17 | End: 2025-05-17

## 2025-02-17 RX ORDER — LISDEXAMFETAMINE DIMESYLATE 30 MG/1
30 CAPSULE ORAL DAILY
Qty: 30 CAPSULE | Refills: 0 | Status: SHIPPED | OUTPATIENT
Start: 2025-03-18 | End: 2025-04-17

## 2025-02-17 RX ORDER — LISDEXAMFETAMINE DIMESYLATE 30 MG/1
30 CAPSULE ORAL DAILY
Qty: 30 CAPSULE | Refills: 0 | Status: SHIPPED | OUTPATIENT
Start: 2025-02-17 | End: 2025-03-19

## 2025-02-17 SDOH — ECONOMIC STABILITY: FOOD INSECURITY: WITHIN THE PAST 12 MONTHS, YOU WORRIED THAT YOUR FOOD WOULD RUN OUT BEFORE YOU GOT MONEY TO BUY MORE.: NEVER TRUE

## 2025-02-17 SDOH — ECONOMIC STABILITY: FOOD INSECURITY: WITHIN THE PAST 12 MONTHS, THE FOOD YOU BOUGHT JUST DIDN'T LAST AND YOU DIDN'T HAVE MONEY TO GET MORE.: NEVER TRUE

## 2025-02-17 ASSESSMENT — PATIENT HEALTH QUESTIONNAIRE - PHQ9
SUM OF ALL RESPONSES TO PHQ QUESTIONS 1-9: 0
SUM OF ALL RESPONSES TO PHQ9 QUESTIONS 1 & 2: 0
2. FEELING DOWN, DEPRESSED OR HOPELESS: NOT AT ALL
SUM OF ALL RESPONSES TO PHQ QUESTIONS 1-9: 0
SUM OF ALL RESPONSES TO PHQ QUESTIONS 1-9: 0
1. LITTLE INTEREST OR PLEASURE IN DOING THINGS: NOT AT ALL
SUM OF ALL RESPONSES TO PHQ QUESTIONS 1-9: 0

## 2025-02-17 ASSESSMENT — ENCOUNTER SYMPTOMS
APNEA: 0
PHOTOPHOBIA: 0
BLOOD IN STOOL: 0
CHOKING: 0
COUGH: 0
ABDOMINAL DISTENTION: 0
ABDOMINAL PAIN: 0
FACIAL SWELLING: 0

## 2025-02-17 NOTE — PROGRESS NOTES
Ania Preston 59 y.o. female presents today with   Chief Complaint   Patient presents with    3 Month Follow-Up    ADD    Medication Refill       ADD  This is a chronic problem. The current episode started more than 1 year ago. The problem occurs daily. The problem has been waxing and waning. Pertinent negatives include no abdominal pain, chest pain, chills, congestion, coughing, fever or rash.   Medication Refill  Pertinent negatives include no abdominal pain, chest pain, chills, congestion, coughing, fever or rash.       Past Medical History:   Diagnosis Date    Anxiety     gummy bears occ. stress at work.    Attention deficit disorder (ADD) in adult     dr Peralta    Thyroid disease      There are no problems to display for this patient.    Past Surgical History:   Procedure Laterality Date    BACK SURGERY       No family history on file.  Social History     Socioeconomic History    Marital status:      Spouse name: None    Number of children: None    Years of education: None    Highest education level: None   Tobacco Use    Smoking status: Never    Smokeless tobacco: Never   Substance and Sexual Activity    Alcohol use: Yes    Drug use: Never     Social Determinants of Health     Financial Resource Strain: Low Risk  (5/9/2024)    Overall Financial Resource Strain (CARDIA)     Difficulty of Paying Living Expenses: Not hard at all   Food Insecurity: No Food Insecurity (2/17/2025)    Hunger Vital Sign     Worried About Running Out of Food in the Last Year: Never true     Ran Out of Food in the Last Year: Never true   Transportation Needs: No Transportation Needs (2/17/2025)    PRAPARE - Transportation     Lack of Transportation (Medical): No     Lack of Transportation (Non-Medical): No   Housing Stability: Low Risk  (2/17/2025)    Housing Stability Vital Sign     Unable to Pay for Housing in the Last Year: No     Number of Times Moved in the Last Year: 0     Homeless in the Last Year: No     No Known

## 2025-02-18 DIAGNOSIS — Z51.81 THERAPEUTIC DRUG MONITORING: ICD-10-CM

## 2025-02-22 LAB
6MAM UR QL: NOT DETECTED
7-AMINOCLONAZEPAM: NOT DETECTED
ALPHA-OH-ALPRAZOLAM: NOT DETECTED
ALPHA-OH-MIDAZOLAM, URINE: NOT DETECTED
ALPRAZOLAM: NOT DETECTED
AMPHET UR QL SCN: PRESENT
BARBITURATES: NEGATIVE
BENZOYLECGONINE: NEGATIVE
BUPRENORPHINE: NOT DETECTED
CARISOPRODOL UR QL: NEGATIVE
CLONAZEPAM UR QL: NOT DETECTED
CODEINE: NOT DETECTED
CREAT UR-MCNC: 40.9 MG/DL (ref 20–400)
DIAZEPAM: NOT DETECTED
ETHYL GLUCURONIDE: NORMAL
FENTANYL UR QL: NOT DETECTED
GABAPENTIN: NOT DETECTED
HYDROCODONE UR QL: NOT DETECTED
HYDROMORPHONE: NOT DETECTED
LORAZEPAM UR QL: NOT DETECTED
MARIJUANA METABOLITE: NEGATIVE
MDA: NOT DETECTED
MDEA: NOT DETECTED
MDMA UR QL: NOT DETECTED
MEPERIDINE: NOT DETECTED
METHADONE: NEGATIVE
METHAMPHETAMINE: NOT DETECTED
METHYLPHENIDATE: NOT DETECTED
MIDAZOLAM UR QL SCN: NOT DETECTED
MORPHINE: NOT DETECTED
NALOXONE: NOT DETECTED
NORBUPRENORPHINE, FREE: NOT DETECTED
NORDIAZEPAM: NOT DETECTED
NORFENTANYL: NOT DETECTED
NORHYDROCODONE, URINE: NOT DETECTED
NOROXYCODONE: NOT DETECTED
NOROXYMORPHONE, URINE: NOT DETECTED
OXAZEPAM UR QL: NOT DETECTED
OXYCODONE UR QL: NOT DETECTED
OXYMORPHONE UR QL: NOT DETECTED
PAIN MANAGEMENT DRUG PANEL: NORMAL
PATHOLOGY STUDY: NORMAL
PCP: NEGATIVE
PHENTERMINE: NOT DETECTED
PREGABALIN: NOT DETECTED
TAPENTADOL, URINE: NOT DETECTED
TAPENTADOL-O-SULFATE, URINE: NOT DETECTED
TEMAZEPAM: NOT DETECTED
TRAMADOL: NEGATIVE
ZOLPIDEM: NOT DETECTED

## 2025-03-22 DIAGNOSIS — E03.9 HYPOTHYROIDISM, UNSPECIFIED TYPE: ICD-10-CM

## 2025-03-22 LAB — TSH REFLEX: 0.67 UIU/ML (ref 0.44–3.86)

## 2025-03-24 ENCOUNTER — RESULTS FOLLOW-UP (OUTPATIENT)
Dept: PRIMARY CARE CLINIC | Age: 60
End: 2025-03-24

## 2025-05-21 ENCOUNTER — OFFICE VISIT (OUTPATIENT)
Dept: PRIMARY CARE CLINIC | Age: 60
End: 2025-05-21
Payer: COMMERCIAL

## 2025-05-21 VITALS
HEIGHT: 64 IN | BODY MASS INDEX: 32.1 KG/M2 | OXYGEN SATURATION: 97 % | SYSTOLIC BLOOD PRESSURE: 132 MMHG | WEIGHT: 188 LBS | HEART RATE: 92 BPM | DIASTOLIC BLOOD PRESSURE: 80 MMHG

## 2025-05-21 DIAGNOSIS — F98.8 ATTENTION DEFICIT DISORDER (ADD) IN ADULT: ICD-10-CM

## 2025-05-21 DIAGNOSIS — E03.9 HYPOTHYROIDISM, UNSPECIFIED TYPE: ICD-10-CM

## 2025-05-21 PROCEDURE — 99213 OFFICE O/P EST LOW 20 MIN: CPT | Performed by: INTERNAL MEDICINE

## 2025-05-21 RX ORDER — LISDEXAMFETAMINE DIMESYLATE 30 MG/1
30 CAPSULE ORAL DAILY
Qty: 30 CAPSULE | Refills: 0 | Status: SHIPPED | OUTPATIENT
Start: 2025-06-20 | End: 2025-07-20

## 2025-05-21 RX ORDER — LISDEXAMFETAMINE DIMESYLATE 30 MG/1
30 CAPSULE ORAL DAILY
Qty: 30 CAPSULE | Refills: 0 | Status: SHIPPED | OUTPATIENT
Start: 2025-05-21 | End: 2025-06-20

## 2025-05-21 RX ORDER — LISDEXAMFETAMINE DIMESYLATE 30 MG/1
30 CAPSULE ORAL DAILY
Qty: 30 CAPSULE | Refills: 0 | Status: SHIPPED | OUTPATIENT
Start: 2025-07-20 | End: 2025-08-19

## 2025-05-21 RX ORDER — LEVOTHYROXINE SODIUM 112 UG/1
120 TABLET ORAL DAILY
Qty: 30 TABLET | Refills: 11 | Status: SHIPPED | OUTPATIENT
Start: 2025-05-21

## 2025-05-21 NOTE — PROGRESS NOTES
Ania Preston 60 y.o. female presents today with   Chief Complaint   Patient presents with    3 Month Follow-Up    ADD    Medication Refill       ADD  This is a chronic problem. The current episode started more than 1 year ago. The problem occurs daily. The problem has been waxing and waning. Pertinent negatives include no anorexia, arthralgias, chest pain, chills, fever, joint swelling or rash.   Medication Refill  This is a chronic (hypothyroid) problem. The problem has been unchanged. Pertinent negatives include no anorexia, arthralgias, chest pain, chills, fever, joint swelling or rash.       Past Medical History:   Diagnosis Date    Anxiety     gummy bears occ. stress at work.    Attention deficit disorder (ADD) in adult     dr Peralta    Thyroid disease      There are no active problems to display for this patient.    Past Surgical History:   Procedure Laterality Date    BACK SURGERY       No family history on file.  Social History     Socioeconomic History    Marital status:      Spouse name: None    Number of children: None    Years of education: None    Highest education level: None   Tobacco Use    Smoking status: Never    Smokeless tobacco: Never   Substance and Sexual Activity    Alcohol use: Yes    Drug use: Never     Social Drivers of Health     Financial Resource Strain: Low Risk  (5/9/2024)    Overall Financial Resource Strain (CARDIA)     Difficulty of Paying Living Expenses: Not hard at all   Food Insecurity: No Food Insecurity (2/17/2025)    Hunger Vital Sign     Worried About Running Out of Food in the Last Year: Never true     Ran Out of Food in the Last Year: Never true   Transportation Needs: No Transportation Needs (2/17/2025)    PRAPARE - Transportation     Lack of Transportation (Medical): No     Lack of Transportation (Non-Medical): No   Housing Stability: Low Risk  (2/17/2025)    Housing Stability Vital Sign     Unable to Pay for Housing in the Last Year: No     Number of Times

## 2025-06-17 ENCOUNTER — HOSPITAL ENCOUNTER (EMERGENCY)
Facility: HOSPITAL | Age: 60
Discharge: HOME | End: 2025-06-17
Payer: COMMERCIAL

## 2025-06-17 ENCOUNTER — APPOINTMENT (OUTPATIENT)
Dept: RADIOLOGY | Facility: HOSPITAL | Age: 60
End: 2025-06-17
Payer: COMMERCIAL

## 2025-06-17 VITALS
TEMPERATURE: 98.2 F | BODY MASS INDEX: 29.88 KG/M2 | OXYGEN SATURATION: 100 % | HEIGHT: 64 IN | SYSTOLIC BLOOD PRESSURE: 137 MMHG | WEIGHT: 175 LBS | DIASTOLIC BLOOD PRESSURE: 91 MMHG | RESPIRATION RATE: 16 BRPM | HEART RATE: 69 BPM

## 2025-06-17 DIAGNOSIS — S96.919A STRAIN OF GREAT TOE: Primary | ICD-10-CM

## 2025-06-17 PROCEDURE — 99283 EMERGENCY DEPT VISIT LOW MDM: CPT

## 2025-06-17 PROCEDURE — 73630 X-RAY EXAM OF FOOT: CPT | Mod: LT

## 2025-06-17 PROCEDURE — 73630 X-RAY EXAM OF FOOT: CPT | Mod: LEFT SIDE | Performed by: RADIOLOGY

## 2025-06-17 RX ORDER — IBUPROFEN 600 MG/1
600 TABLET, FILM COATED ORAL EVERY 6 HOURS PRN
Qty: 28 TABLET | Refills: 0 | Status: SHIPPED | OUTPATIENT
Start: 2025-06-17 | End: 2025-06-24

## 2025-06-17 RX ORDER — DICYCLOMINE HYDROCHLORIDE 10 MG/1
10 CAPSULE ORAL ONCE
Status: DISCONTINUED | OUTPATIENT
Start: 2025-06-17 | End: 2025-06-17

## 2025-06-17 ASSESSMENT — PAIN - FUNCTIONAL ASSESSMENT: PAIN_FUNCTIONAL_ASSESSMENT: 0-10

## 2025-06-17 ASSESSMENT — LIFESTYLE VARIABLES
TOTAL SCORE: 0
EVER HAD A DRINK FIRST THING IN THE MORNING TO STEADY YOUR NERVES TO GET RID OF A HANGOVER: NO
HAVE PEOPLE ANNOYED YOU BY CRITICIZING YOUR DRINKING: NO
HAVE YOU EVER FELT YOU SHOULD CUT DOWN ON YOUR DRINKING: NO
EVER FELT BAD OR GUILTY ABOUT YOUR DRINKING: NO

## 2025-06-17 ASSESSMENT — PAIN DESCRIPTION - ORIENTATION: ORIENTATION: LEFT

## 2025-06-17 ASSESSMENT — PAIN SCALES - GENERAL: PAINLEVEL_OUTOF10: 4

## 2025-06-17 ASSESSMENT — PAIN DESCRIPTION - PAIN TYPE: TYPE: ACUTE PAIN

## 2025-06-17 NOTE — Clinical Note
Mera Nayak was seen and treated in our emergency department on 6/17/2025.  She may return to work on 06/18/2025.       If you have any questions or concerns, please don't hesitate to call.      Hector Callejas PA-C

## 2025-06-17 NOTE — ED PROVIDER NOTES
HPI   Chief Complaint   Patient presents with    Toe Injury     Tripped on outside steps. Toes on left foot bent backwards.        HPI  Patient is a 60-year-old female with a past medical history of ADHD, hypothyroidism that presents to the ED for evaluation of left foot pain.  Patient reports yesterday around 5 PM she was walking upstairs when the stair broke under her foot and her toes bent upward.  She is having persistent pain mostly to the left great toe.  Took ibuprofen last night.  Is having pain with walking.  She is able to bear weight on her foot.  Denies numbness or tingling.  No head injury.  Landed on her left wrist.      Patient History   Medical History[1]  Surgical History[2]  Family History[3]  Social History[4]    Physical Exam   ED Triage Vitals [06/17/25 0912]   Temperature Heart Rate Respirations BP   36.8 °C (98.2 °F) 67 15 172/82      Pulse Ox Temp Source Heart Rate Source Patient Position   99 % Temporal Monitor Sitting      BP Location FiO2 (%)     Right arm --       Physical Exam  Vitals reviewed.   Constitutional:       General: She is not in acute distress.     Appearance: Normal appearance. She is not ill-appearing or toxic-appearing.   HENT:      Head: Normocephalic and atraumatic.   Eyes:      General: No scleral icterus.        Right eye: No discharge.         Left eye: No discharge.      Conjunctiva/sclera: Conjunctivae normal.   Cardiovascular:      Rate and Rhythm: Normal rate and regular rhythm.      Pulses: Normal pulses.           Dorsalis pedis pulses are 2+ on the right side and 2+ on the left side.      Heart sounds: Normal heart sounds. No murmur heard.  Pulmonary:      Effort: Pulmonary effort is normal.      Breath sounds: Normal breath sounds.   Musculoskeletal:      Right wrist: No swelling, tenderness, bony tenderness or snuff box tenderness. Normal pulse.      Left wrist: No swelling, tenderness, bony tenderness or snuff box tenderness. Normal pulse.      Cervical  back: Neck supple.      Right ankle: Normal. No swelling or deformity. Anterior drawer test negative. Normal pulse.      Left ankle: Normal. No swelling or deformity. Anterior drawer test negative. Normal pulse.      Right foot: Normal capillary refill. No swelling, deformity, tenderness, bony tenderness or crepitus. Normal pulse.      Left foot: Normal range of motion and normal capillary refill. Swelling, tenderness and bony tenderness present. No deformity, foot drop or crepitus. Normal pulse.   Skin:     General: Skin is warm and dry.      Capillary Refill: Capillary refill takes less than 2 seconds.   Neurological:      General: No focal deficit present.      Mental Status: She is alert and oriented to person, place, and time.      Sensory: No sensory deficit.      Motor: No weakness.   Psychiatric:         Mood and Affect: Mood normal.         Behavior: Behavior normal.           ED Course & MDM   ED Course as of 06/17/25 1133   e Jun 17, 2025   1131 XR foot left 3+ views  IMPRESSION:  No radiographic evidence of an acute fracture.       [HK]      ED Course User Index  [HK] Hector Callejas PA-C         Diagnoses as of 06/17/25 1133   Strain of great toe                 No data recorded     Alan Coma Scale Score: 15 (06/17/25 0937 : Lew Rawls, JOSE)                           Medical Decision Making  Patient is a 60-year-old female with a past medical history of ADHD, hypothyroidism that presents to the ED for evaluation of left foot pain.  On exam patient is well-appearing and in no acute distress.  Vital signs are stable.  Cardiopulmonary exam largely unremarkable.  MSK exam significant for minimal swelling around left great toe.  There is flexion and extension intact at the distal joint.  Tenderness over first 2 toes.  No overlying skin changes or lacerations.  No ankle involvement.  2+ DP pulses.  Sensation intact.  Good cap refill.  Differential includes but is not limited to foot sprain/strain,  acute bony abnormality.  Low suspicion for acute infectious etiology based on H&P.    X-ray of left foot on independent interpretation shows no acute bony abnormality..  Patient declines pain management in the ER today.  She was placed in a postop shoe.    Social determinants of health affecting care:  None     I independently reviewed all imaging and labs.    Patient was informed of the aforementioned findings.  Symptoms are felt to be due to foot/toe strain.  Patient will be prescribed ibuprofen.     At this time, low suspicion for an acute process that would necessitate further emergent workup, urgent specialist consultation, or hospitalization.  Based on clinical workup, the disposition of discharge is appropriate.  Advised patient to pursue close follow-up with PCP.  Patient was provided with appropriate information to assist in obtaining the proper follow-up.  Discussed strict return to ED protocols with patient, including low threshold to return for changing/worsening symptoms.  Patient demonstrated understanding and agreement with the plan of care, and all questions answered prior to discharge.  Patient stable at time of discharge.     --------------------------------------------------------------------------------------------------------------------------------------------------------------------------------------------------------------------------    This note was dictated using a speech recognition program.  While an attempt was made at proof reading to minimize errors, minor errors in transcription may be present call for questions.     Procedure  Procedures       [1] History reviewed. No pertinent past medical history.  [2] History reviewed. No pertinent surgical history.  [3] No family history on file.  [4]   Social History  Tobacco Use    Smoking status: Never    Smokeless tobacco: Never   Substance Use Topics    Alcohol use: Not Currently    Drug use: Never        Hector Callejas PA-C  06/17/25  1269

## 2025-06-17 NOTE — DISCHARGE INSTRUCTIONS
You were seen in the emergency department for evaluation of left foot pain after an injury.    Your x-ray showed no broken bones.    You were prescribed ibuprofen.  You can rest, elevate and ice the foot to help with pain.    Please follow-up with your primary care provider.  If you do not have a primary care provider you can call [phone] to make an appointment.    Please do not hesitate to return to the ED if symptoms change or worsen.

## 2025-08-19 DIAGNOSIS — F98.8 ATTENTION DEFICIT DISORDER (ADD) IN ADULT: ICD-10-CM

## 2025-08-19 RX ORDER — LISDEXAMFETAMINE DIMESYLATE 30 MG/1
30 CAPSULE ORAL DAILY
Qty: 30 CAPSULE | Refills: 0 | Status: SHIPPED | OUTPATIENT
Start: 2025-08-19 | End: 2025-08-21 | Stop reason: SDUPTHER

## 2025-08-21 ENCOUNTER — OFFICE VISIT (OUTPATIENT)
Dept: PRIMARY CARE CLINIC | Age: 60
End: 2025-08-21
Payer: COMMERCIAL

## 2025-08-21 VITALS
DIASTOLIC BLOOD PRESSURE: 74 MMHG | SYSTOLIC BLOOD PRESSURE: 130 MMHG | BODY MASS INDEX: 32.27 KG/M2 | HEIGHT: 64 IN | WEIGHT: 189 LBS | OXYGEN SATURATION: 98 % | HEART RATE: 90 BPM

## 2025-08-21 DIAGNOSIS — F98.8 ATTENTION DEFICIT DISORDER (ADD) IN ADULT: ICD-10-CM

## 2025-08-21 PROCEDURE — 99213 OFFICE O/P EST LOW 20 MIN: CPT | Performed by: INTERNAL MEDICINE

## 2025-08-21 RX ORDER — LISDEXAMFETAMINE DIMESYLATE 30 MG/1
30 CAPSULE ORAL DAILY
Qty: 30 CAPSULE | Refills: 0 | Status: SHIPPED | OUTPATIENT
Start: 2025-09-20 | End: 2025-10-20

## 2025-08-21 RX ORDER — LISDEXAMFETAMINE DIMESYLATE 30 MG/1
30 CAPSULE ORAL DAILY
Qty: 30 CAPSULE | Refills: 0 | Status: SHIPPED | OUTPATIENT
Start: 2025-10-20 | End: 2025-11-19

## 2025-08-21 RX ORDER — LISDEXAMFETAMINE DIMESYLATE 30 MG/1
30 CAPSULE ORAL DAILY
Qty: 30 CAPSULE | Refills: 0 | Status: SHIPPED | OUTPATIENT
Start: 2025-08-21 | End: 2025-09-20